# Patient Record
Sex: MALE | Race: BLACK OR AFRICAN AMERICAN | NOT HISPANIC OR LATINO
[De-identification: names, ages, dates, MRNs, and addresses within clinical notes are randomized per-mention and may not be internally consistent; named-entity substitution may affect disease eponyms.]

---

## 2017-03-07 PROBLEM — Z00.00 ENCOUNTER FOR PREVENTIVE HEALTH EXAMINATION: Status: ACTIVE | Noted: 2017-03-07

## 2017-03-24 ENCOUNTER — APPOINTMENT (OUTPATIENT)
Dept: VASCULAR SURGERY | Facility: CLINIC | Age: 52
End: 2017-03-24

## 2019-11-25 ENCOUNTER — INPATIENT (INPATIENT)
Facility: HOSPITAL | Age: 54
LOS: 5 days | Discharge: HOME | End: 2019-12-01
Attending: HOSPITALIST | Admitting: HOSPITALIST
Payer: MEDICARE

## 2019-11-25 VITALS
TEMPERATURE: 102 F | OXYGEN SATURATION: 96 % | HEART RATE: 103 BPM | RESPIRATION RATE: 18 BRPM | DIASTOLIC BLOOD PRESSURE: 89 MMHG | SYSTOLIC BLOOD PRESSURE: 135 MMHG

## 2019-11-25 DIAGNOSIS — Z94.0 KIDNEY TRANSPLANT STATUS: Chronic | ICD-10-CM

## 2019-11-25 DIAGNOSIS — N18.3 CHRONIC KIDNEY DISEASE, STAGE 3 (MODERATE): ICD-10-CM

## 2019-11-25 LAB
ALBUMIN SERPL ELPH-MCNC: 3.8 G/DL — SIGNIFICANT CHANGE UP (ref 3.5–5.2)
ALP SERPL-CCNC: 81 U/L — SIGNIFICANT CHANGE UP (ref 30–115)
ALT FLD-CCNC: 16 U/L — SIGNIFICANT CHANGE UP (ref 0–41)
ANION GAP SERPL CALC-SCNC: 16 MMOL/L — HIGH (ref 7–14)
APPEARANCE UR: CLEAR — SIGNIFICANT CHANGE UP
AST SERPL-CCNC: 20 U/L — SIGNIFICANT CHANGE UP (ref 0–41)
BACTERIA # UR AUTO: NEGATIVE — SIGNIFICANT CHANGE UP
BASOPHILS # BLD AUTO: 0.02 K/UL — SIGNIFICANT CHANGE UP (ref 0–0.2)
BASOPHILS NFR BLD AUTO: 0.3 % — SIGNIFICANT CHANGE UP (ref 0–1)
BILIRUB SERPL-MCNC: 2.1 MG/DL — HIGH (ref 0.2–1.2)
BILIRUB UR-MCNC: NEGATIVE — SIGNIFICANT CHANGE UP
BUN SERPL-MCNC: 21 MG/DL — HIGH (ref 10–20)
CALCIUM SERPL-MCNC: 9.6 MG/DL — SIGNIFICANT CHANGE UP (ref 8.5–10.1)
CHLORIDE SERPL-SCNC: 98 MMOL/L — SIGNIFICANT CHANGE UP (ref 98–110)
CO2 SERPL-SCNC: 22 MMOL/L — SIGNIFICANT CHANGE UP (ref 17–32)
COLOR SPEC: YELLOW — SIGNIFICANT CHANGE UP
CREAT SERPL-MCNC: 1.6 MG/DL — HIGH (ref 0.7–1.5)
DIFF PNL FLD: ABNORMAL
EOSINOPHIL # BLD AUTO: 0 K/UL — SIGNIFICANT CHANGE UP (ref 0–0.7)
EOSINOPHIL NFR BLD AUTO: 0 % — SIGNIFICANT CHANGE UP (ref 0–8)
EPI CELLS # UR: 1 /HPF — SIGNIFICANT CHANGE UP (ref 0–5)
GLUCOSE SERPL-MCNC: 101 MG/DL — HIGH (ref 70–99)
GLUCOSE UR QL: NEGATIVE — SIGNIFICANT CHANGE UP
HCT VFR BLD CALC: 39 % — LOW (ref 42–52)
HGB BLD-MCNC: 12.7 G/DL — LOW (ref 14–18)
HYALINE CASTS # UR AUTO: 0 /LPF — SIGNIFICANT CHANGE UP (ref 0–7)
IMM GRANULOCYTES NFR BLD AUTO: 0.8 % — HIGH (ref 0.1–0.3)
KETONES UR-MCNC: NEGATIVE — SIGNIFICANT CHANGE UP
LACTATE SERPL-SCNC: 1.3 MMOL/L — SIGNIFICANT CHANGE UP (ref 0.5–2.2)
LEUKOCYTE ESTERASE UR-ACNC: NEGATIVE — SIGNIFICANT CHANGE UP
LIDOCAIN IGE QN: 23 U/L — SIGNIFICANT CHANGE UP (ref 7–60)
LYMPHOCYTES # BLD AUTO: 0.4 K/UL — LOW (ref 1.2–3.4)
LYMPHOCYTES # BLD AUTO: 6.4 % — LOW (ref 20.5–51.1)
MAGNESIUM SERPL-MCNC: 1.4 MG/DL — LOW (ref 1.8–2.4)
MCHC RBC-ENTMCNC: 29.6 PG — SIGNIFICANT CHANGE UP (ref 27–31)
MCHC RBC-ENTMCNC: 32.6 G/DL — SIGNIFICANT CHANGE UP (ref 32–37)
MCV RBC AUTO: 90.9 FL — SIGNIFICANT CHANGE UP (ref 80–94)
MONOCYTES # BLD AUTO: 0.53 K/UL — SIGNIFICANT CHANGE UP (ref 0.1–0.6)
MONOCYTES NFR BLD AUTO: 8.5 % — SIGNIFICANT CHANGE UP (ref 1.7–9.3)
NEUTROPHILS # BLD AUTO: 5.21 K/UL — SIGNIFICANT CHANGE UP (ref 1.4–6.5)
NEUTROPHILS NFR BLD AUTO: 84 % — HIGH (ref 42.2–75.2)
NITRITE UR-MCNC: NEGATIVE — SIGNIFICANT CHANGE UP
NRBC # BLD: 0 /100 WBCS — SIGNIFICANT CHANGE UP (ref 0–0)
PH UR: 6.5 — SIGNIFICANT CHANGE UP (ref 5–8)
PLATELET # BLD AUTO: 73 K/UL — LOW (ref 130–400)
POTASSIUM SERPL-MCNC: 4.5 MMOL/L — SIGNIFICANT CHANGE UP (ref 3.5–5)
POTASSIUM SERPL-SCNC: 4.5 MMOL/L — SIGNIFICANT CHANGE UP (ref 3.5–5)
PROT SERPL-MCNC: 7 G/DL — SIGNIFICANT CHANGE UP (ref 6–8)
PROT UR-MCNC: ABNORMAL
RBC # BLD: 4.29 M/UL — LOW (ref 4.7–6.1)
RBC # FLD: 12.2 % — SIGNIFICANT CHANGE UP (ref 11.5–14.5)
RBC CASTS # UR COMP ASSIST: 20 /HPF — HIGH (ref 0–4)
SODIUM SERPL-SCNC: 136 MMOL/L — SIGNIFICANT CHANGE UP (ref 135–146)
SP GR SPEC: 1.02 — SIGNIFICANT CHANGE UP (ref 1.01–1.02)
UROBILINOGEN FLD QL: ABNORMAL
WBC # BLD: 6.21 K/UL — SIGNIFICANT CHANGE UP (ref 4.8–10.8)
WBC # FLD AUTO: 6.21 K/UL — SIGNIFICANT CHANGE UP (ref 4.8–10.8)
WBC UR QL: 11 /HPF — HIGH (ref 0–5)

## 2019-11-25 PROCEDURE — 71046 X-RAY EXAM CHEST 2 VIEWS: CPT | Mod: 26

## 2019-11-25 PROCEDURE — 76770 US EXAM ABDO BACK WALL COMP: CPT | Mod: 26

## 2019-11-25 PROCEDURE — 99285 EMERGENCY DEPT VISIT HI MDM: CPT

## 2019-11-25 RX ORDER — ALLOPURINOL 300 MG
1 TABLET ORAL
Qty: 0 | Refills: 0 | DISCHARGE

## 2019-11-25 RX ORDER — METOPROLOL TARTRATE 50 MG
100 TABLET ORAL
Refills: 0 | Status: DISCONTINUED | OUTPATIENT
Start: 2019-11-25 | End: 2019-12-01

## 2019-11-25 RX ORDER — OMEPRAZOLE 10 MG/1
1 CAPSULE, DELAYED RELEASE ORAL
Qty: 0 | Refills: 0 | DISCHARGE

## 2019-11-25 RX ORDER — CYCLOSPORINE 100 MG/1
75 CAPSULE ORAL AT BEDTIME
Refills: 0 | Status: DISCONTINUED | OUTPATIENT
Start: 2019-11-25 | End: 2019-12-01

## 2019-11-25 RX ORDER — CYCLOSPORINE 100 MG/1
100 CAPSULE ORAL DAILY
Refills: 0 | Status: DISCONTINUED | OUTPATIENT
Start: 2019-11-25 | End: 2019-12-01

## 2019-11-25 RX ORDER — MYCOPHENOLATE MOFETIL 250 MG/1
500 CAPSULE ORAL
Refills: 0 | Status: DISCONTINUED | OUTPATIENT
Start: 2019-11-25 | End: 2019-11-25

## 2019-11-25 RX ORDER — CHLORHEXIDINE GLUCONATE 213 G/1000ML
1 SOLUTION TOPICAL
Refills: 0 | Status: DISCONTINUED | OUTPATIENT
Start: 2019-11-25 | End: 2019-12-01

## 2019-11-25 RX ORDER — CEFTRIAXONE 500 MG/1
1000 INJECTION, POWDER, FOR SOLUTION INTRAMUSCULAR; INTRAVENOUS ONCE
Refills: 0 | Status: COMPLETED | OUTPATIENT
Start: 2019-11-25 | End: 2019-11-25

## 2019-11-25 RX ORDER — AMLODIPINE BESYLATE 2.5 MG/1
5 TABLET ORAL DAILY
Refills: 0 | Status: DISCONTINUED | OUTPATIENT
Start: 2019-11-25 | End: 2019-12-01

## 2019-11-25 RX ORDER — AMLODIPINE BESYLATE 2.5 MG/1
1 TABLET ORAL
Qty: 0 | Refills: 0 | DISCHARGE

## 2019-11-25 RX ORDER — MYCOPHENOLATE MOFETIL 250 MG/1
1 CAPSULE ORAL
Qty: 0 | Refills: 0 | DISCHARGE

## 2019-11-25 RX ORDER — APIXABAN 2.5 MG/1
5 TABLET, FILM COATED ORAL EVERY 12 HOURS
Refills: 0 | Status: DISCONTINUED | OUTPATIENT
Start: 2019-11-25 | End: 2019-12-01

## 2019-11-25 RX ORDER — SODIUM CHLORIDE 9 MG/ML
2500 INJECTION, SOLUTION INTRAVENOUS ONCE
Refills: 0 | Status: COMPLETED | OUTPATIENT
Start: 2019-11-25 | End: 2019-11-25

## 2019-11-25 RX ORDER — ACETAMINOPHEN 500 MG
650 TABLET ORAL ONCE
Refills: 0 | Status: COMPLETED | OUTPATIENT
Start: 2019-11-25 | End: 2019-11-25

## 2019-11-25 RX ORDER — CYCLOSPORINE 100 MG/1
1 CAPSULE ORAL
Qty: 0 | Refills: 0 | DISCHARGE

## 2019-11-25 RX ORDER — CYCLOSPORINE 100 MG/1
100 CAPSULE ORAL DAILY
Refills: 0 | Status: DISCONTINUED | OUTPATIENT
Start: 2019-11-25 | End: 2019-11-25

## 2019-11-25 RX ORDER — METOPROLOL TARTRATE 50 MG
1 TABLET ORAL
Qty: 0 | Refills: 0 | DISCHARGE

## 2019-11-25 RX ORDER — ALLOPURINOL 300 MG
100 TABLET ORAL DAILY
Refills: 0 | Status: DISCONTINUED | OUTPATIENT
Start: 2019-11-25 | End: 2019-12-01

## 2019-11-25 RX ORDER — MEROPENEM 1 G/30ML
1000 INJECTION INTRAVENOUS EVERY 12 HOURS
Refills: 0 | Status: DISCONTINUED | OUTPATIENT
Start: 2019-11-25 | End: 2019-11-29

## 2019-11-25 RX ORDER — PRAZOSIN HCL 2 MG
1 CAPSULE ORAL
Qty: 0 | Refills: 0 | DISCHARGE

## 2019-11-25 RX ORDER — DOXAZOSIN MESYLATE 4 MG
2 TABLET ORAL AT BEDTIME
Refills: 0 | Status: DISCONTINUED | OUTPATIENT
Start: 2019-11-25 | End: 2019-11-27

## 2019-11-25 RX ORDER — APIXABAN 2.5 MG/1
1 TABLET, FILM COATED ORAL
Qty: 0 | Refills: 0 | DISCHARGE

## 2019-11-25 RX ORDER — PANTOPRAZOLE SODIUM 20 MG/1
40 TABLET, DELAYED RELEASE ORAL
Refills: 0 | Status: DISCONTINUED | OUTPATIENT
Start: 2019-11-25 | End: 2019-12-01

## 2019-11-25 RX ORDER — MAGNESIUM OXIDE 400 MG ORAL TABLET 241.3 MG
400 TABLET ORAL
Refills: 0 | Status: COMPLETED | OUTPATIENT
Start: 2019-11-25 | End: 2019-11-26

## 2019-11-25 RX ADMIN — CYCLOSPORINE 75 MILLIGRAM(S): 100 CAPSULE ORAL at 21:19

## 2019-11-25 RX ADMIN — Medication 100 MILLIGRAM(S): at 17:49

## 2019-11-25 RX ADMIN — MEROPENEM 100 MILLIGRAM(S): 1 INJECTION INTRAVENOUS at 17:50

## 2019-11-25 RX ADMIN — AMLODIPINE BESYLATE 5 MILLIGRAM(S): 2.5 TABLET ORAL at 17:49

## 2019-11-25 RX ADMIN — Medication 2 MILLIGRAM(S): at 21:19

## 2019-11-25 RX ADMIN — Medication 100 MILLIGRAM(S): at 21:24

## 2019-11-25 RX ADMIN — MAGNESIUM OXIDE 400 MG ORAL TABLET 400 MILLIGRAM(S): 241.3 TABLET ORAL at 17:47

## 2019-11-25 RX ADMIN — Medication 650 MILLIGRAM(S): at 13:42

## 2019-11-25 RX ADMIN — SODIUM CHLORIDE 2500 MILLILITER(S): 9 INJECTION, SOLUTION INTRAVENOUS at 13:40

## 2019-11-25 RX ADMIN — CEFTRIAXONE 100 MILLIGRAM(S): 500 INJECTION, POWDER, FOR SOLUTION INTRAMUSCULAR; INTRAVENOUS at 14:58

## 2019-11-25 NOTE — H&P ADULT - ASSESSMENT
54 year old male with history of kidney transplant ( 14 years ago ), HTN, DVT on Eliquis, and BPH presents with fever for 3 days duration.     # Sepsis secondary to urinary tract infection, failed treatment with cefdinir, Positive culture ( No details ) from [facility]   # Painful Macroscopic Hematuria. Stable Hemoglobin   # Hyperbilirubinemia   # Stage 3A CKD, Cr 1.6 at Baseline per old chart    # H/O BPH, Uncontrolled Obstructing Urinary Symptoms on Prazosin  # H/O Kidney transplant, ON Cyclosporin and Mycophenolate  # HTN, Controlled. On Amlodipine; developed LE edema, continued per PMD  # DVT on Eliquis   # HypoMG    - Obtain records from [facility]  - Meropenem, check the culture and repeat here   - Febrile, and hemodynamically stable    - Renal Ultrasound   - Urology Eval   - Fractionated Bilirubin   - C/w with cyclosporin and mycophenolate  - replet MG    - DVT PPX on AC  - DASH diet   - Full code   - Dispo Home 54 year old male with history of kidney transplant ( 14 years ago ), HTN, DVT on Eliquis, and BPH presents with fever for 3 days duration.     # Sepsis secondary to urinary tract infection, failed treatment with cefdinir, Positive culture ( No details ) from [facility]   # Painful Macroscopic Hematuria. Stable Hemoglobin   # Hyperbilirubinemia   # Stage 3A CKD, Cr 1.6 at Baseline per old chart    # H/O BPH, Uncontrolled Obstructing Urinary Symptoms on Prazosin  # H/O Kidney transplant, ON Cyclosporin, prednisone and Mycophenolate  # HTN, Controlled. On Amlodipine; developed LE edema, continued per PMD  # DVT on Eliquis   # HypoMG    - Obtain records from [facility]  - Meropenem, check the culture and repeat here   - Febrile, and hemodynamically stable    - Renal Ultrasound   - Urology Eval   - Fractionated Bilirubin   - C/w with cyclosporin and mycophenolate  - replet MG    - DVT PPX on AC  - DASH diet   - Full code   - Dispo Home 54 year old male with history of kidney transplant ( 14 years ago ), HTN, DVT on Eliquis, and BPH presents with fever for 3 days duration.     # Sepsis secondary to urinary tract infection, failed treatment with cefdinir, Positive culture ( No details ) from [facility].   # Painful Macroscopic Hematuria. Stable Hemoglobin, ( cystitis vs stone? vs prostate enlargement vs side effect of cyclosporine )   # Hyperbilirubinemia, cyclosporin is associated with hyperBili  # Stage 3A CKD, Cr 1.6 at Baseline per old chart    # H/O BPH, Uncontrolled Obstructing Urinary Symptoms on Prazosin  # H/O Kidney transplant, ON Cyclosporin, prednisone and Mycophenolate  # HTN, Controlled. On Amlodipine; developed LE edema, continued per PMD  # DVT on Eliquis   # HypoMG    - Obtain records from [facility]  - Meropenem, check the culture and repeat here   - Febrile, and hemodynamically stable    - CT Abdomin pelvis to rule out obstructive uropathy   - Urology Eval   - Fractionated Bilirubin   - C/w with cyclosporin and mycophenolate  - replet MG  - f/u CBC    - DVT PPX on AC  - DASH diet   - Full code   - Dispo Home 54 year old male with history of kidney transplant ( 14 years ago ), HTN, DVT on Eliquis, and BPH presents with fever for 3 days duration.     # Sepsis secondary to urinary tract infection, failed treatment with cefdinir, Positive culture ( No details ) from [facility].   # Painful Macroscopic Hematuria. Stable Hemoglobin, ( cystitis vs stone? vs prostate enlargement vs side effect of cyclosporine )   # Hyperbilirubinemia, cyclosporin is associated with hyperBili  # Stage 3A CKD, Cr 1.6 at Baseline per old chart    # H/O BPH, Uncontrolled Obstructing Urinary Symptoms on Prazosin  # H/O Kidney transplant, ON Cyclosporin, prednisone and Mycophenolate  # HTN, Controlled. On Amlodipine; developed LE edema, continued per PMD  # DVT on Eliquis   # HypoMG    - Obtain records from [facility]  - Meropenem, check the culture and repeat here   - Febrile, and hemodynamically stable    - Renal US, rule out obstructive uropathy  - Urology Eval   - Fractionated Bilirubin   - C/w with cyclosporin and mycophenolate  - replet MG  - f/u CBC    - DVT PPX on AC  - DASH diet   - Full code   - Dispo Home 54 year old male with history of kidney transplant ( 14 years ago ), HTN, DVT on Eliquis, and BPH presents with fever for 3 days duration.     # Sepsis secondary to urinary tract infection, failed treatment with cefdinir, Positive culture ( No details ) from [facility].   # Painful Macroscopic Hematuria. Stable Hemoglobin, ( cystitis vs stone? vs prostate enlargement vs side effect of cyclosporine )   # Hyperbilirubinemia, cyclosporin is associated with hyperBili  # Stage 3A CKD, Cr 1.6 at Baseline per old chart    # H/O BPH, Uncontrolled Obstructing Urinary Symptoms on Prazosin  # H/O Kidney transplant, ON Cyclosporin, prednisone and Mycophenolate  # HTN, Controlled. On Amlodipine; developed LE edema, continued per PMD  # DVT on Eliquis   # HypoMG    - Obtain records from [facility]  - Meropenem, check the culture and repeat here   - Febrile, and hemodynamically stable    - Renal US, rule out obstructive uropathy  - Urology Eval   - Fractionated Bilirubin   - C/w with cyclosporin, mycophenolate, prednisone and allopurinol  - replete MG  - f/u CBC    - DVT PPX on AC  - DASH diet   - Full code   - Dispo Home

## 2019-11-25 NOTE — ED ADULT TRIAGE NOTE - CHIEF COMPLAINT QUOTE
was at [facility] and had blood cultures drawn. was called this morning and told he had bacteria growth. [place] so he came here. pt had uti. and placed on abx. running fever since saturday.

## 2019-11-25 NOTE — ED PROVIDER NOTE - ATTENDING CONTRIBUTION TO CARE
53 yo M htn, renal transplant, now sent for + blood cx at [facility] done 3 days ago.  still with fevers.  vss, nontoxic, well appearing, pink conj, anicteric, MMM, neck supple, CTAB, RRR, equal radial pulses bilat, abd soft/nt/nd, no cva tend. no calves tend, no edema, no fnd. no rashes.  a/p: labs, imaging, reassess    IVF given as per ideal body weight.  BMI > 30

## 2019-11-25 NOTE — ED PROVIDER NOTE - PHYSICAL EXAMINATION
VITAL SIGNS: I have reviewed nursing notes and confirm.  CONSTITUTIONAL: Well-developed; well-nourished; in no acute distress. pt comfortable.  SKIN: skin exam is warm and dry, no acute rash.   HEAD: Normocephalic; atraumatic.  EYES:  EOM intact; conjunctiva and sclera clear.  ENT: No nasal discharge; airway clear. moist oral mucosa;  NECK: Supple; non tender.  CARD: S1, S2 normal; no murmurs, gallops, or rubs. Regular rate and rhythm. posterior tibial and radial pulses 2+  RESP: No wheezes, rales or rhonchi. cta b/l. no use of accessory muscles. no retractions  ABD: Normal bowel sounds; soft; non-distended; non-tender; no rebound. negative psoas, rovsign's and murphys.  EXT: Normal ROM. No  cyanosis or edema.  BACK: No cva tenderness  LYMPH: No acute cervical adenopathy.  NEURO: Alert, oriented, grossly unremarkable.    PSYCH: Cooperative, appropriate.

## 2019-11-25 NOTE — H&P ADULT - NSHPPHYSICALEXAM_GEN_ALL_CORE
GENERAL: NAD, lying in bed comfortably  HEAD:  Atraumatic, Normocephalic  EYES: EOMI, PERRLA, conjunctiva and sclera clear  ENT: Moist mucous membranes  NECK: Supple, No JVD  CHEST/LUNG: Clear to auscultation bilaterally  HEART: Regular rate and rhythm; No murmurs, rubs, or gallops  ABDOMEN: Soft, Nontender, Nondistended. No hepatomegally  EXTREMITIES:  2+ Peripheral Pulses, brisk capillary refill. No clubbing, cyanosis, , b/l lower EX edema  NERVOUS SYSTEM:  Alert & Oriented X3, speech clear. No deficits   MSK: FROM all 4 extremities, full and equal strength  SKIN: No rashes or lesions Vital Signs Last 24 Hrs  T(C): 37.3 (26 Nov 2019 07:35), Max: 37.9 (26 Nov 2019 00:50)  T(F): 99.2 (26 Nov 2019 07:35), Max: 100.3 (26 Nov 2019 00:50)  HR: 77 (26 Nov 2019 07:35) (65 - 77)  BP: 124/74 (26 Nov 2019 07:35) (110/66 - 133/91)  BP(mean): --  RR: 18 (26 Nov 2019 07:35) (18 - 19)  SpO2: 98% (26 Nov 2019 07:35) (98% - 99%)    GENERAL: NAD, lying in bed comfortably  HEAD:  Atraumatic, Normocephalic  EYES: EOMI, PERRLA, conjunctiva and sclera clear  ENT: Moist mucous membranes  NECK: Supple, No JVD  CHEST/LUNG: Clear to auscultation bilaterally  HEART: Regular rate and rhythm; No murmurs, rubs, or gallops  ABDOMEN: Soft, Nontender, Nondistended. No hepatomegally  EXTREMITIES:  2+ Peripheral Pulses, brisk capillary refill. No clubbing, cyanosis, , b/l lower EX edema  NERVOUS SYSTEM:  Alert & Oriented X3, speech clear. No deficits   MSK: FROM all 4 extremities, full and equal strength  SKIN: No rashes or lesions

## 2019-11-25 NOTE — H&P ADULT - HISTORY OF PRESENT ILLNESS
54 year old male with history of kidney transplant ( 14 years ago ), HTN, DVT on Eliquis, and  BPH presents with fever for 3 days duration.   Patient was doing well until 3 days prior to presentation when started to have fever and chills associated with red urine and burning sensation with urination. Was seen in [facility] ED  and started on Cefdinir, fever has not resolved and patient got call back from the ED as his culture - not sure source - came back positive. Was not told which type of bacteria.   Symptoms has not resolved since started the Abx, Reports obstructing urinary symptoms for the last month including; hesitancy, straining, slow flow  frequent urination, continuos feeling of full bladder and dysuria.  In ED Temp 102.4, 103, 135/89, and RR18. admitted for Sepsis secondary to Pyelonephritis.

## 2019-11-25 NOTE — ED PROVIDER NOTE - OBJECTIVE STATEMENT
54 year old male with history of kidney transplant ( 14 years ago ), HTN, DVT on Eliquis, and  BPH presents with fever for 3 days duration.   Patient was doing well until 3 days prior to presentation when started to have fever and chills associated with red urine and burning sensation with urination. Was seen in [facility] ED  and started on Cefdinir, fever has not resolved and patient got call back from the ED as his culture - not sure source - came back positive. Was not told which type of bacteria.   Symptoms has not resolved since started the Abx, Reports obstructing urinary symptoms for the last month including; hesitancy, straining, slow flow  frequent urination, continuos feeling of full bladder and dysuria.

## 2019-11-26 LAB
APPEARANCE UR: CLEAR — SIGNIFICANT CHANGE UP
BILIRUB UR-MCNC: NEGATIVE — SIGNIFICANT CHANGE UP
COLOR SPEC: YELLOW — SIGNIFICANT CHANGE UP
DIFF PNL FLD: NEGATIVE — SIGNIFICANT CHANGE UP
GLUCOSE UR QL: NEGATIVE — SIGNIFICANT CHANGE UP
KETONES UR-MCNC: NEGATIVE — SIGNIFICANT CHANGE UP
LEUKOCYTE ESTERASE UR-ACNC: NEGATIVE — SIGNIFICANT CHANGE UP
NITRITE UR-MCNC: NEGATIVE — SIGNIFICANT CHANGE UP
PH UR: 6.5 — SIGNIFICANT CHANGE UP (ref 5–8)
PROT UR-MCNC: SIGNIFICANT CHANGE UP
SP GR SPEC: 1.01 — SIGNIFICANT CHANGE UP (ref 1.01–1.02)
UROBILINOGEN FLD QL: ABNORMAL

## 2019-11-26 PROCEDURE — 99223 1ST HOSP IP/OBS HIGH 75: CPT | Mod: AI

## 2019-11-26 RX ORDER — MAGNESIUM SULFATE 500 MG/ML
1 VIAL (ML) INJECTION ONCE
Refills: 0 | Status: COMPLETED | OUTPATIENT
Start: 2019-11-26 | End: 2019-11-26

## 2019-11-26 RX ORDER — ACETAMINOPHEN 500 MG
650 TABLET ORAL ONCE
Refills: 0 | Status: COMPLETED | OUTPATIENT
Start: 2019-11-26 | End: 2019-11-26

## 2019-11-26 RX ORDER — INFLUENZA VIRUS VACCINE 15; 15; 15; 15 UG/.5ML; UG/.5ML; UG/.5ML; UG/.5ML
0.5 SUSPENSION INTRAMUSCULAR ONCE
Refills: 0 | Status: DISCONTINUED | OUTPATIENT
Start: 2019-11-26 | End: 2019-12-01

## 2019-11-26 RX ADMIN — Medication 100 MILLIGRAM(S): at 17:25

## 2019-11-26 RX ADMIN — APIXABAN 5 MILLIGRAM(S): 2.5 TABLET, FILM COATED ORAL at 17:25

## 2019-11-26 RX ADMIN — Medication 5 MILLIGRAM(S): at 05:31

## 2019-11-26 RX ADMIN — MEROPENEM 100 MILLIGRAM(S): 1 INJECTION INTRAVENOUS at 05:30

## 2019-11-26 RX ADMIN — Medication 2 MILLIGRAM(S): at 21:31

## 2019-11-26 RX ADMIN — Medication 100 GRAM(S): at 12:09

## 2019-11-26 RX ADMIN — CYCLOSPORINE 75 MILLIGRAM(S): 100 CAPSULE ORAL at 21:31

## 2019-11-26 RX ADMIN — Medication 100 MILLIGRAM(S): at 12:09

## 2019-11-26 RX ADMIN — MEROPENEM 100 MILLIGRAM(S): 1 INJECTION INTRAVENOUS at 17:25

## 2019-11-26 RX ADMIN — Medication 650 MILLIGRAM(S): at 00:50

## 2019-11-26 RX ADMIN — Medication 650 MILLIGRAM(S): at 01:47

## 2019-11-26 RX ADMIN — MAGNESIUM OXIDE 400 MG ORAL TABLET 400 MILLIGRAM(S): 241.3 TABLET ORAL at 09:16

## 2019-11-26 RX ADMIN — APIXABAN 5 MILLIGRAM(S): 2.5 TABLET, FILM COATED ORAL at 05:31

## 2019-11-26 RX ADMIN — Medication 100 MILLIGRAM(S): at 05:33

## 2019-11-26 RX ADMIN — AMLODIPINE BESYLATE 5 MILLIGRAM(S): 2.5 TABLET ORAL at 05:31

## 2019-11-26 NOTE — CONSULT NOTE ADULT - SUBJECTIVE AND OBJECTIVE BOX
[place] [facility] INITIAL CONSULT NOTE  --------------------------------------------------------------------------------  HPI:  54 year old male with history of end stage renal disease presumed secondary to hypertensive nephrosclerosis. He is status post  donor kidney transplant in  at [place]. He also has a PMH of HTN, DVT on Eliquis, and  BPH.  He presents with fever for 3 days duration.   Patient was doing well until 3 days prior to presentation when started to have fever and chills associated with hematuria and burning sensation with urination. Was seen in [facility] ED  and started on Cefdinir, fever has not resolved and patient got call back from the ED as his blood culture came back positive and was told to come back to the ER.  Symptoms has not resolved since started the Abx, Reports obstructing urinary symptoms for the last month including; hesitancy, straining, slow flow  frequent urination, continuos feeling of full bladder and dysuria.  In ED Temp 102.4, 103, 135/89, and RR18. Admitted for Sepsis secondary to Pyelonephritis.     PAST HISTORY  --------------------------------------------------------------------------------  PAST MEDICAL & SURGICAL HISTORY:  HTN (hypertension)  Kidney transplanted  Transplanted kidney    FAMILY HISTORY:  FH: kidney disease    SOCIAL HISTORY:  Denies current ETOH, tobacco, or illicit drug use    ALLERGIES & MEDICATIONS  --------------------------------------------------------------------------------  Allergies    No Known Allergies    Standing Inpatient Medications  allopurinol 100 milliGRAM(s) Oral daily  amLODIPine   Tablet 5 milliGRAM(s) Oral daily  apixaban 5 milliGRAM(s) Oral every 12 hours  chlorhexidine 4% Liquid 1 Application(s) Topical <User Schedule>  cycloSPORINE  (SandIMMUNE) 100 milliGRAM(s) Oral daily  cycloSPORINE  (SandIMMUNE) 75 milliGRAM(s) Oral at bedtime  doxazosin 2 milliGRAM(s) Oral at bedtime  influenza   Vaccine 0.5 milliLiter(s) IntraMuscular once  magnesium oxide 400 milliGRAM(s) Oral three times a day with meals  magnesium sulfate  IVPB 1 Gram(s) IV Intermittent once  meropenem  IVPB 1000 milliGRAM(s) IV Intermittent every 12 hours  metoprolol tartrate 100 milliGRAM(s) Oral two times a day  pantoprazole    Tablet 40 milliGRAM(s) Oral before breakfast  predniSONE   Tablet 5 milliGRAM(s) Oral daily    REVIEW OF SYSTEMS  --------------------------------------------------------------------------------  Gen:  fevers/chills  Skin: No rashes  Head/Eyes/Ears/Mouth: No headache; No sinus pain/discomfort, sore throat  Respiratory: No dyspnea, cough, wheezing, hemoptysis  CV: No chest pain, PND, orthopnea  GI: No abdominal pain, diarrhea, constipation, nausea, vomiting, melena, hematochezia  All other systems were reviewed and are negative, except as noted.    VITALS/PHYSICAL EXAM  --------------------------------------------------------------------------------  T(C): 37.3 (19 @ 07:35), Max: 39.1 (19 @ 11:55)  HR: 77 (11-26-19 @ 07:35) (65 - 103)  BP: 124/74 (19 @ 07:35) (110/66 - 135/89)  RR: 18 (19 @ 07:35) (18 - 19)  SpO2: 98% (19 @ 07:35) (96% - 99%)  Weight (kg): 96.6 (19 @ 11:59)    Physical Exam:  	Gen: NAD  	HEENT: Supple neck, clear oropharynx  	Pulm: CTA B/L  	CV: RRR, S1S2  	Back: No CVA tenderness; no sacral edema  	Abd: +BS, soft, nontender/nondistended  	: No suprapubic tenderness  	LE: Warm, no edema  	Neuro: AAO x3  	Psych: Normal affect and mood    LABS/STUDIES  --------------------------------------------------------------------------------              12.7   6.21  >-----------<  73       [19 @ 14:40]              39.0     136  |  98  |  21  ----------------------------<  101      [19 @ 14:40]  4.5   |  22  |  1.6        Ca     9.6     [19 @ 14:40]      Mg     1.4     [19 @ 14:40]    TPro  7.0  /  Alb  3.8  /  TBili  2.1  /  DBili  x   /  AST  20  /  ALT  16  /  AlkPhos  81  [19 @ 14:40]    Creatinine Trend:  SCr 1.6 [ @ 14:40]    Urinalysis - [19 @ 08:54]      Color Yellow / Appearance Clear / SG 1.013 / pH 6.5      Gluc Negative / Ketone Negative  / Bili Negative / Urobili 3 mg/dL       Blood Negative / Protein Trace / Leuk Est Negative / Nitrite Negative      RBC  / WBC  / Hyaline  / Gran  / Sq Epi  / Non Sq Epi  / Bacteria

## 2019-11-26 NOTE — CONSULT NOTE ADULT - SUBJECTIVE AND OBJECTIVE BOX
This is a 53 y/o male s/p renal transplant in May 2005. Urology was consulted for hematuria. Pt states he noticed blood in his urine since Saturday morning. Patient complains of feeling pressure after and before he urinates. Patient admits to medium stream, frequency q 45min - 1 hour, urgency incontinence, nocturia x 6. Patient is a 54y old  Male who presents with a chief complaint of fever and urinary symptoms (2019 11:48)      HPI:  54 year old male with history of kidney transplant ( 14 years ago ), HTN, DVT on Eliquis, and  BPH presents with fever for 3 days duration.   Patient was doing well until 3 days prior to presentation when started to have fever and chills associated with red urine and burning sensation with urination. Was seen in [facility] ED  and started on Cefdinir, fever has not resolved and patient got call back from the ED as his culture - not sure source - came back positive. Was not told which type of bacteria.   Symptoms has not resolved since started the Abx, Reports obstructing urinary symptoms for the last month including; hesitancy, straining, slow flow  frequent urination, continuos feeling of full bladder and dysuria.  In ED Temp 102.4, 103, 135/89, and RR18. Admitted for Sepsis secondary to Pyelonephritis. (2019 16:29)    UROLOGY: Pt with c/o dysuria and hematuria upon presentation.  Medium stream, + frequency q 45 min - 1 hr, nocturia x 5-6, + moderate urgency with incontinence, denies hesitancy, straining,   voids in small amounts, + PV fullness and dribbling    PAST MEDICAL & SURGICAL HISTORY:  HTN (hypertension)  Kidney transplanted  Transplanted kidney      REVIEW OF SYSTEMS:  [x] a 10 point review of systems was negative except where noted    [  ]  Due to altered mental status/intubation, subjective information was not able t be obtained from the patient.  History was obtained, to the extent possible, from review of the chart and collateral sources of information.      MEDICATIONS  (STANDING):  allopurinol 100 milliGRAM(s) Oral daily  amLODIPine   Tablet 5 milliGRAM(s) Oral daily  apixaban 5 milliGRAM(s) Oral every 12 hours  chlorhexidine 4% Liquid 1 Application(s) Topical <User Schedule>  cycloSPORINE  (SandIMMUNE) 100 milliGRAM(s) Oral daily  cycloSPORINE  (SandIMMUNE) 75 milliGRAM(s) Oral at bedtime  doxazosin 2 milliGRAM(s) Oral at bedtime  influenza   Vaccine 0.5 milliLiter(s) IntraMuscular once  meropenem  IVPB 1000 milliGRAM(s) IV Intermittent every 12 hours  metoprolol tartrate 100 milliGRAM(s) Oral two times a day  pantoprazole    Tablet 40 milliGRAM(s) Oral before breakfast  predniSONE   Tablet 5 milliGRAM(s) Oral daily    MEDICATIONS  (PRN):      Allergies    No Known Allergies    SOCIAL HISTORY: No illicit drug use    FAMILY HISTORY:  FH: kidney disease    Vital Signs Last 24 Hrs  T(C): 37.3 (2019 07:35), Max: 37.9 (2019 00:50)  T(F): 99.2 (2019 07:35), Max: 100.3 (2019 00:50)  HR: 77 (2019 07:35) (65 - 77)  BP: 124/74 (2019 07:35) (110/66 - 133/91)  RR: 18 (2019 07:35) (18 - 19)  SpO2: 98% (2019 07:35) (98% - 99%)      PHYSICAL EXAM:    Constitutional: NAD, well-developed  Back: No CVA tenderness  Respiratory: No accessory respiratory muscle use  Abd: Soft, NT/ND  no organomegally  no hernia  : uncircumcised easily retractible no lesions, testicles x 2 sym, non tender   VANDANA: deferred at this time  Extremities: no edema  Neurological: A/O x 3  Psychiatric: Normal mood, normal affect  Skin: No rashes    I&O's Summary      LABS:                        12.7   6.21  )-----------( 73       ( 2019 14:40 )             39.0         136  |  98  |  21<H>  ----------------------------<  101<H>  4.5   |  22  |  1.6<H>    Ca    9.6      2019 14:40  Mg     1.4         TPro  7.0  /  Alb  3.8  /  TBili  2.1<H>  /  DBili  x   /  AST  20  /  ALT  16  /  AlkPhos  81        Urinalysis Basic - ( 2019 08:54 )    Color: Yellow / Appearance: Clear / S.013 / pH: x  Gluc: x / Ketone: Negative  / Bili: Negative / Urobili: 3 mg/dL   Blood: x / Protein: Trace / Nitrite: Negative   Leuk Esterase: Negative / RBC: x / WBC x   Sq Epi: x / Non Sq Epi: x / Bacteria: x      Urine Culture: pending        RADIOLOGY & ADDITIONAL STUDIES:      US Retroperitoneal Complete (19 @ 18:36) >    EXAM:  US RETROPERITONEAL COMPLETE            PROCEDURE DATE:  2019            INTERPRETATION:  CLINICAL HISTORY: Possible obstruction. Back pain.    COMPARISON: None.    PROCEDURE: Retroperitoneal Ultrasound was performed.    FINDINGS:    RIGHT KIDNEY: Atrophic and echogenic, size measuring 7.6 cm in length.   Right upper pole cyst measures 3.5 x 4.5 x 4.1 cm. No evidence of   hydronephrosis, calculus or solid mass. Vascular flow is demonstrated at   the hilum.    LEFT KIDNEY: Atrophic and echogenic, size measuring 8.1 cm in length. No   evidence of hydronephrosis, calculus or solid mass. Vascular flow is   demonstrated at the hilum.     RIGHT ILIAC FOSSA TRANSPLANT KIDNEY: Normal in size measuring 11.8 cm in   length. Normal cortical medullary differentiation. No hydronephrosis. No   definite calculi. Normal perfusion.    URINARY BLADDER: Prevoid volume of approximately 356 cc.  No wall   thickening, debris or calculus is seen. Bilateral ureteral jets are   visualized. Postvoid volume is approximately 159 cc.    IMPRESSION:  No stones or hydronephrosis.    Atrophic native kidneys.    Right iliac fossa transplant kidney is normal in appearance, without   stones or hydronephrosis.    Post void residual volume of 159 cc.        NATALIIA LACEY M.D., ATTENDING RADIOLOGIST  This document has been electronically signed. 2019  7:01PM  < end of copied text >

## 2019-11-26 NOTE — PROGRESS NOTE ADULT - ASSESSMENT
# Sepsis secondary to urinary tract infection, failed treatment with cefdinir, Positive culture ( No details ) from [facility].   - f/u urine cultures from this admission  - c/w meropenem for now  - US shows no hydronephrosis  - last fever 100.3 overnight      # Painful Macroscopic Hematuria. Stable Hemoglobin, ( cystitis vs stone? vs prostate enlargement vs side effect of cyclosporine )   # Hyperbilirubinemia, cyclosporin is associated with hyperBili  # Stage 3A CKD, Cr 1.6 at Baseline per old chart    # H/O BPH, Uncontrolled Obstructing Urinary Symptoms on Prazosin  # H/O Kidney transplant, ON Cyclosporin, prednisone and Mycophenolate  # HTN, Controlled. On Amlodipine; developed LE edema, continued per PMD  # DVT on Eliquis   # HypoMG    - Obtain records from [facility]  - Meropenem, check the culture and repeat here   - Febrile, and hemodynamically stable    - Renal US, rule out obstructive uropathy  - Urology Eval   - Fractionated Bilirubin   - C/w with cyclosporin, mycophenolate, prednisone and allopurinol  - replete MG  - f/u CBC    - DVT PPX on AC  - DASH diet   - Full code   - Dispo Home

## 2019-11-26 NOTE — PROGRESS NOTE ADULT - SUBJECTIVE AND OBJECTIVE BOX
SUBJECTIVE:    Patient is a 54y old Male who presents with a chief complaint of fever and urinary symptoms (2019 11:41)    Stable, no acute events.    PAST MEDICAL & SURGICAL HISTORY  HTN (hypertension)  Kidney transplanted  Transplanted kidney       ALLERGIES:  No Known Allergies    MEDICATIONS:  STANDING MEDICATIONS  allopurinol 100 milliGRAM(s) Oral daily  amLODIPine   Tablet 5 milliGRAM(s) Oral daily  apixaban 5 milliGRAM(s) Oral every 12 hours  chlorhexidine 4% Liquid 1 Application(s) Topical <User Schedule>  cycloSPORINE  (SandIMMUNE) 100 milliGRAM(s) Oral daily  cycloSPORINE  (SandIMMUNE) 75 milliGRAM(s) Oral at bedtime  doxazosin 2 milliGRAM(s) Oral at bedtime  influenza   Vaccine 0.5 milliLiter(s) IntraMuscular once  magnesium oxide 400 milliGRAM(s) Oral three times a day with meals  magnesium sulfate  IVPB 1 Gram(s) IV Intermittent once  meropenem  IVPB 1000 milliGRAM(s) IV Intermittent every 12 hours  metoprolol tartrate 100 milliGRAM(s) Oral two times a day  pantoprazole    Tablet 40 milliGRAM(s) Oral before breakfast  predniSONE   Tablet 5 milliGRAM(s) Oral daily    PRN MEDICATIONS    VITALS:   T(F): 99.2  HR: 77  BP: 124/74  RR: 18  SpO2: 98%    LABS:                        12.7   6.21  )-----------( 73       ( 2019 14:40 )             39.0         136  |  98  |  21<H>  ----------------------------<  101<H>  4.5   |  22  |  1.6<H>    Ca    9.6      2019 14:40  Mg     1.4         TPro  7.0  /  Alb  3.8  /  TBili  2.1<H>  /  DBili  x   /  AST  20  /  ALT  16  /  AlkPhos  81        Urinalysis Basic - ( 2019 08:54 )    Color: Yellow / Appearance: Clear / S.013 / pH: x  Gluc: x / Ketone: Negative  / Bili: Negative / Urobili: 3 mg/dL   Blood: x / Protein: Trace / Nitrite: Negative   Leuk Esterase: Negative / RBC: x / WBC x   Sq Epi: x / Non Sq Epi: x / Bacteria: x        Lactate, Blood: 1.3 mmol/L (19 @ 14:40)

## 2019-11-27 DIAGNOSIS — R31.0 GROSS HEMATURIA: ICD-10-CM

## 2019-11-27 LAB
ANION GAP SERPL CALC-SCNC: 14 MMOL/L — SIGNIFICANT CHANGE UP (ref 7–14)
ANISOCYTOSIS BLD QL: SLIGHT — SIGNIFICANT CHANGE UP
BASOPHILS # BLD AUTO: 0.04 K/UL — SIGNIFICANT CHANGE UP (ref 0–0.2)
BASOPHILS NFR BLD AUTO: 0.9 % — SIGNIFICANT CHANGE UP (ref 0–1)
BUN SERPL-MCNC: 28 MG/DL — HIGH (ref 10–20)
CALCIUM SERPL-MCNC: 8.8 MG/DL — SIGNIFICANT CHANGE UP (ref 8.5–10.1)
CHLORIDE SERPL-SCNC: 100 MMOL/L — SIGNIFICANT CHANGE UP (ref 98–110)
CO2 SERPL-SCNC: 23 MMOL/L — SIGNIFICANT CHANGE UP (ref 17–32)
CREAT SERPL-MCNC: 1.4 MG/DL — SIGNIFICANT CHANGE UP (ref 0.7–1.5)
CULTURE RESULTS: NO GROWTH — SIGNIFICANT CHANGE UP
EOSINOPHIL # BLD AUTO: 0 K/UL — SIGNIFICANT CHANGE UP (ref 0–0.7)
EOSINOPHIL NFR BLD AUTO: 0 % — SIGNIFICANT CHANGE UP (ref 0–8)
GIANT PLATELETS BLD QL SMEAR: PRESENT — SIGNIFICANT CHANGE UP
GLUCOSE SERPL-MCNC: 136 MG/DL — HIGH (ref 70–99)
HCT VFR BLD CALC: 35.1 % — LOW (ref 42–52)
HGB BLD-MCNC: 11.3 G/DL — LOW (ref 14–18)
LYMPHOCYTES # BLD AUTO: 0.51 K/UL — LOW (ref 1.2–3.4)
LYMPHOCYTES # BLD AUTO: 13 % — LOW (ref 20.5–51.1)
MAGNESIUM SERPL-MCNC: 1.5 MG/DL — LOW (ref 1.8–2.4)
MANUAL SMEAR VERIFICATION: SIGNIFICANT CHANGE UP
MCHC RBC-ENTMCNC: 29 PG — SIGNIFICANT CHANGE UP (ref 27–31)
MCHC RBC-ENTMCNC: 32.2 G/DL — SIGNIFICANT CHANGE UP (ref 32–37)
MCV RBC AUTO: 90.2 FL — SIGNIFICANT CHANGE UP (ref 80–94)
MICROCYTES BLD QL: SLIGHT — SIGNIFICANT CHANGE UP
MONOCYTES # BLD AUTO: 0.51 K/UL — SIGNIFICANT CHANGE UP (ref 0.1–0.6)
MONOCYTES NFR BLD AUTO: 13 % — HIGH (ref 1.7–9.3)
NEUTROPHILS # BLD AUTO: 2.65 K/UL — SIGNIFICANT CHANGE UP (ref 1.4–6.5)
NEUTROPHILS NFR BLD AUTO: 63.5 % — SIGNIFICANT CHANGE UP (ref 42.2–75.2)
NEUTS BAND # BLD: 3.5 % — SIGNIFICANT CHANGE UP (ref 0–6)
PLAT MORPH BLD: NORMAL — SIGNIFICANT CHANGE UP
PLATELET # BLD AUTO: 80 K/UL — LOW (ref 130–400)
POTASSIUM SERPL-MCNC: 4.1 MMOL/L — SIGNIFICANT CHANGE UP (ref 3.5–5)
POTASSIUM SERPL-SCNC: 4.1 MMOL/L — SIGNIFICANT CHANGE UP (ref 3.5–5)
PROMYELOCYTES # FLD: 0.9 % — HIGH (ref 0–0)
RBC # BLD: 3.89 M/UL — LOW (ref 4.7–6.1)
RBC # FLD: 12.2 % — SIGNIFICANT CHANGE UP (ref 11.5–14.5)
RBC BLD AUTO: NORMAL — SIGNIFICANT CHANGE UP
SODIUM SERPL-SCNC: 137 MMOL/L — SIGNIFICANT CHANGE UP (ref 135–146)
SPECIMEN SOURCE: SIGNIFICANT CHANGE UP
VARIANT LYMPHS # BLD: 5.2 % — HIGH (ref 0–5)
WBC # BLD: 3.95 K/UL — LOW (ref 4.8–10.8)
WBC # FLD AUTO: 3.95 K/UL — LOW (ref 4.8–10.8)

## 2019-11-27 PROCEDURE — 99233 SBSQ HOSP IP/OBS HIGH 50: CPT

## 2019-11-27 RX ORDER — MAGNESIUM SULFATE 500 MG/ML
2 VIAL (ML) INJECTION ONCE
Refills: 0 | Status: COMPLETED | OUTPATIENT
Start: 2019-11-27 | End: 2019-11-28

## 2019-11-27 RX ORDER — TAMSULOSIN HYDROCHLORIDE 0.4 MG/1
0.4 CAPSULE ORAL AT BEDTIME
Refills: 0 | Status: DISCONTINUED | OUTPATIENT
Start: 2019-11-27 | End: 2019-12-01

## 2019-11-27 RX ORDER — ACETAMINOPHEN 500 MG
650 TABLET ORAL ONCE
Refills: 0 | Status: COMPLETED | OUTPATIENT
Start: 2019-11-27 | End: 2019-11-27

## 2019-11-27 RX ADMIN — MEROPENEM 100 MILLIGRAM(S): 1 INJECTION INTRAVENOUS at 17:03

## 2019-11-27 RX ADMIN — CYCLOSPORINE 100 MILLIGRAM(S): 100 CAPSULE ORAL at 10:32

## 2019-11-27 RX ADMIN — Medication 5 MILLIGRAM(S): at 06:06

## 2019-11-27 RX ADMIN — MEROPENEM 100 MILLIGRAM(S): 1 INJECTION INTRAVENOUS at 06:05

## 2019-11-27 RX ADMIN — Medication 650 MILLIGRAM(S): at 06:33

## 2019-11-27 RX ADMIN — Medication 100 MILLIGRAM(S): at 11:41

## 2019-11-27 RX ADMIN — CYCLOSPORINE 75 MILLIGRAM(S): 100 CAPSULE ORAL at 22:02

## 2019-11-27 RX ADMIN — PANTOPRAZOLE SODIUM 40 MILLIGRAM(S): 20 TABLET, DELAYED RELEASE ORAL at 06:06

## 2019-11-27 RX ADMIN — Medication 100 MILLIGRAM(S): at 06:06

## 2019-11-27 RX ADMIN — AMLODIPINE BESYLATE 5 MILLIGRAM(S): 2.5 TABLET ORAL at 06:06

## 2019-11-27 RX ADMIN — APIXABAN 5 MILLIGRAM(S): 2.5 TABLET, FILM COATED ORAL at 17:12

## 2019-11-27 RX ADMIN — Medication 100 MILLIGRAM(S): at 17:04

## 2019-11-27 RX ADMIN — TAMSULOSIN HYDROCHLORIDE 0.4 MILLIGRAM(S): 0.4 CAPSULE ORAL at 21:57

## 2019-11-27 RX ADMIN — APIXABAN 5 MILLIGRAM(S): 2.5 TABLET, FILM COATED ORAL at 06:06

## 2019-11-27 NOTE — PROGRESS NOTE ADULT - SUBJECTIVE AND OBJECTIVE BOX
SUBJECTIVE:    Patient is a 54y old Male who presents with a chief complaint of fever and urinary symptoms (2019 14:10)    Stable, no acute events.    PAST MEDICAL & SURGICAL HISTORY  HTN (hypertension)  Kidney transplanted  Transplanted kidney       ALLERGIES:  No Known Allergies    MEDICATIONS:  STANDING MEDICATIONS  allopurinol 100 milliGRAM(s) Oral daily  amLODIPine   Tablet 5 milliGRAM(s) Oral daily  apixaban 5 milliGRAM(s) Oral every 12 hours  chlorhexidine 4% Liquid 1 Application(s) Topical <User Schedule>  cycloSPORINE  (SandIMMUNE) 100 milliGRAM(s) Oral daily  cycloSPORINE  (SandIMMUNE) 75 milliGRAM(s) Oral at bedtime  influenza   Vaccine 0.5 milliLiter(s) IntraMuscular once  magnesium sulfate  IVPB 2 Gram(s) IV Intermittent once  meropenem  IVPB 1000 milliGRAM(s) IV Intermittent every 12 hours  metoprolol tartrate 100 milliGRAM(s) Oral two times a day  pantoprazole    Tablet 40 milliGRAM(s) Oral before breakfast  predniSONE   Tablet 5 milliGRAM(s) Oral daily  tamsulosin 0.4 milliGRAM(s) Oral at bedtime    PRN MEDICATIONS    VITALS:   T(F): 96.7  HR: 62  BP: 127/86  RR: 18  SpO2: 96%    LABS:                        11.3   3.95  )-----------( 80       ( 2019 07:47 )             35.1     11-    137  |  100  |  28<H>  ----------------------------<  136<H>  4.1   |  23  |  1.4    Ca    8.8      2019 07:47  Mg     1.5     11-        Urinalysis Basic - ( 2019 08:54 )    Color: Yellow / Appearance: Clear / S.013 / pH: x  Gluc: x / Ketone: Negative  / Bili: Negative / Urobili: 3 mg/dL   Blood: x / Protein: Trace / Nitrite: Negative   Leuk Esterase: Negative / RBC: x / WBC x   Sq Epi: x / Non Sq Epi: x / Bacteria: x            Culture - Urine (collected 2019 08:54)  Source: .Urine Clean Catch (Midstream)  Final Report (2019 16:17):    No growth    Culture - Urine (collected 2019 18:00)  Source: .Urine Clean Catch (Midstream)  Final Report (2019 01:50):    No growth    Culture - Urine (collected 2019 12:41)  Source: .Urine Clean Catch (Midstream)  Final Report (2019 00:14):    No growth    Culture - Blood (collected 2019 12:41)  Source: .Blood Blood  Preliminary Report (2019 23:01):    No growth to date.                  PHYSICAL EXAM:  GEN: NAD, comfortable  LUNGS: CTAB, no w/r/r  HEART: RRR, no m/r/g  ABD: soft, NT/ND, +BS  EXT: no edema, PP b/l  NEURO: AAOx3

## 2019-11-27 NOTE — PROGRESS NOTE ADULT - ASSESSMENT
# Sepsis secondary to urinary tract infection, failed treatment with cefdinir, Positive culture ( No details ) from [facility].   - f/u urine cultures and blood cultures from this admission  - c/w meropenem for now  - US shows no hydronephrosis  - afebrile for over 24hrs  - reached out to [facility] - had blood cultures positive for E. Coli - mostly pansensitive   - thus far cultures on this admission have been negative, will repeat in AM    # Painful Macroscopic Hematuria. Stable Hemoglobin, ( cystitis vs stone? vs prostate enlargement vs side effect of cyclosporine )   # Hyperbilirubinemia, cyclosporin is associated with hyperBili  # Stage 3A CKD, Cr 1.6 at Baseline per old chart    # H/O BPH, Uncontrolled Obstructing Urinary Symptoms on Prazosin - switched over to tamsulosin   # H/O Kidney transplant, ON Cyclosporin, prednisone and Mycophenolate  # HTN, Controlled. On Amlodipine; developed LE edema, continued per PMD  # DVT on Eliquis   # HypoMG  - repleted    - DVT PPX on AC  - DASH diet   - Full code   - Dispo Home # Sepsis secondary to urinary tract infection, failed treatment with cefdinir, Positive culture ( No details ) from [facility].   - f/u urine cultures and blood cultures from this admission  - c/w meropenem for now  - US shows no hydronephrosis  - afebrile for over 24hrs  - reached out to [facility] ED - had blood cultures positive for E. Coli - mostly pansensitive - sensitive to ceftriaxone, bactrim, cefdinir, resistant to unasyn, cefazolin  - thus far cultures on this admission have been negative, will repeat in AM    # Painful Macroscopic Hematuria. Stable Hemoglobin, ( cystitis vs stone? vs prostate enlargement vs side effect of cyclosporine )   # Hyperbilirubinemia, cyclosporin is associated with hyperBili  # Stage 3A CKD, Cr 1.6 at Baseline per old chart    # H/O BPH, Uncontrolled Obstructing Urinary Symptoms on Prazosin - switched over to tamsulosin   # H/O Kidney transplant, ON Cyclosporin, prednisone and Mycophenolate  # HTN, Controlled. On Amlodipine; developed LE edema, continued per PMD  # DVT on Eliquis   # HypoMG  - repleted    - DVT PPX on AC  - DASH diet   - Full code   - Dispo Home

## 2019-11-27 NOTE — PROGRESS NOTE ADULT - ASSESSMENT
Pt is a 55yo M s/p renal transplant presented with      A) Moderate lower  urinary tract symptoms.  incomplete bladder emptying   hematuria        P) start Flomax 0.4mg if not contraindicated   consider repeat sonogram in 24 hour to check PVR.  OP f/u for LUTS w/u cystoscopy for hematuria. Pt is a 53yo M s/p renal transplant presented with      A) Moderate lower  urinary tract symptoms.  incomplete bladder emptying   hematuria        P) start Flomax 0.4mg if not contraindicated   would prefer this over cardura/prazosin for BPH, as more efficacious d/w medical team  consider repeat sonogram in 24 hour to check PVR.  OP f/u for LUTS w/u cystoscopy for hematuria.

## 2019-11-27 NOTE — PROGRESS NOTE ADULT - SUBJECTIVE AND OBJECTIVE BOX
[facility] FOLLOW UP NOTE  --------------------------------------------------------------------------------  24 hour events/subjective: Patient examined. Appears comfortable.    PAST HISTORY  --------------------------------------------------------------------------------  No significant changes to PMH, PSH, FHx, SHx, unless otherwise noted    ALLERGIES & MEDICATIONS  --------------------------------------------------------------------------------  Allergies    No Known Allergies    Standing Inpatient Medications  allopurinol 100 milliGRAM(s) Oral daily  amLODIPine   Tablet 5 milliGRAM(s) Oral daily  apixaban 5 milliGRAM(s) Oral every 12 hours  chlorhexidine 4% Liquid 1 Application(s) Topical <User Schedule>  cycloSPORINE  (SandIMMUNE) 100 milliGRAM(s) Oral daily  cycloSPORINE  (SandIMMUNE) 75 milliGRAM(s) Oral at bedtime  influenza   Vaccine 0.5 milliLiter(s) IntraMuscular once  meropenem  IVPB 1000 milliGRAM(s) IV Intermittent every 12 hours  metoprolol tartrate 100 milliGRAM(s) Oral two times a day  pantoprazole    Tablet 40 milliGRAM(s) Oral before breakfast  predniSONE   Tablet 5 milliGRAM(s) Oral daily  tamsulosin 0.4 milliGRAM(s) Oral at bedtime    VITALS/PHYSICAL EXAM  --------------------------------------------------------------------------------  T(C): 36.2 (11-27-19 @ 07:52), Max: 37.4 (11-27-19 @ 06:02)  HR: 63 (11-27-19 @ 07:52) (63 - 76)  BP: 139/86 (11-27-19 @ 07:52) (109/74 - 139/86)  RR: 18 (11-27-19 @ 07:52) (18 - 18)  SpO2: 96% (11-27-19 @ 07:52) (96% - 97%)    Physical Exam:  	Gen: NAD  	Pulm: CTA B/L  	CV: RRR, S1S2  	Abd: +BS, soft, nontender/nondistended  	: No suprapubic tenderness  	LE: Warm, no edema    LABS/STUDIES  --------------------------------------------------------------------------------              11.3   3.95  >-----------<  80       [11-27-19 @ 07:47]              35.1     137  |  100  |  28  ----------------------------<  136      [11-27-19 @ 07:47]  4.1   |  23  |  1.4        Ca     8.8     [11-27-19 @ 07:47]      Mg     1.5     [11-27-19 @ 07:47]    TPro  7.0  /  Alb  3.8  /  TBili  2.1  /  DBili  x   /  AST  20  /  ALT  16  /  AlkPhos  81  [11-25-19 @ 14:40]    Creatinine Trend:  SCr 1.4 [11-27 @ 07:47]  SCr 1.6 [11-25 @ 14:40]    Urinalysis - [11-26-19 @ 08:54]      Color Yellow / Appearance Clear / SG 1.013 / pH 6.5      Gluc Negative / Ketone Negative  / Bili Negative / Urobili 3 mg/dL       Blood Negative / Protein Trace / Leuk Est Negative / Nitrite Negative      RBC  / WBC  / Hyaline  / Gran  / Sq Epi  / Non Sq Epi  / Bacteria

## 2019-11-27 NOTE — PROGRESS NOTE ADULT - ASSESSMENT
1. UTI  2. ?Bacteremia/septicemia  3. Kidney transplant, creatinine baseline  4. 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 HTN    Plan:    Obtain medical records from [facility]  Continue meropenem  Followup blood and urine cultures (clear so far)  ID f/u  F/U cyclosporine level  Hold mycophenolate for now, resume once afebrile x 24 hours  Magnesium supplementation  Daily BMP

## 2019-11-27 NOTE — PROGRESS NOTE ADULT - SUBJECTIVE AND OBJECTIVE BOX
HPI:    PAST MEDICAL & SURGICAL HISTORY:  HTN (hypertension)  Kidney transplanted  Transplanted kidney      Allergies    No Known Allergies        MEDICATIONS  (STANDING):  allopurinol 100 milliGRAM(s) Oral daily  amLODIPine   Tablet 5 milliGRAM(s) Oral daily  apixaban 5 milliGRAM(s) Oral every 12 hours  chlorhexidine 4% Liquid 1 Application(s) Topical <User Schedule>  cycloSPORINE  (SandIMMUNE) 100 milliGRAM(s) Oral daily  cycloSPORINE  (SandIMMUNE) 75 milliGRAM(s) Oral at bedtime  doxazosin 2 milliGRAM(s) Oral at bedtime  influenza   Vaccine 0.5 milliLiter(s) IntraMuscular once  meropenem  IVPB 1000 milliGRAM(s) IV Intermittent every 12 hours  metoprolol tartrate 100 milliGRAM(s) Oral two times a day  pantoprazole    Tablet 40 milliGRAM(s) Oral before breakfast  predniSONE   Tablet 5 milliGRAM(s) Oral daily      ROS  General:	no fever, weight loss,  chills  Respiratory and Thorax: no cough, wheeze,  sob  Cardiovascular:	no chest pain, palpitations, dizziness  Gastrointestinal:	no nausea, vomiting, diarrhea, abd pain  Genitourinary:	no dysuria, hematuria          Vital Signs Last 24 Hrs  T(F): 97.2 (2019 07:52), Max: 99.3 (2019 06:02)  HR: 63 (2019 07:52) (63 - 76)  BP: 139/86 (2019 07:52) (109/74 - 139/86)  RR: 18 (2019 07:52) (18 - 18)  SpO2: 96% (2019 07:52) (96% - 97%)    PHYSICAL EXAM:      Constitutional: A&Ox4  Respiratory: cta b/l  Cardiovascular: s1 s2 rrr  Gastrointestinal: soft nt  nd + bs no rebound or guarding  Genitourinary: no cva tenderness  Extremities: normal rom, no edema, calf tenderness                              11.3   3.95  )-----------( 80       ( 2019 07:47 )             35.1           137  |  100  |  28<H>  ----------------------------<  136<H>  4.1   |  23  |  1.4    Ca    8.8      2019 07:47  Mg     1.5         TPro  7.0  /  Alb  3.8  /  TBili  2.1<H>  /  DBili  x   /  AST  20  /  ALT  16  /  AlkPhos  81            Urinalysis Basic - ( 2019 08:54 )    Color: Yellow / Appearance: Clear / S.013 / pH: x  Gluc: x / Ketone: Negative  / Bili: Negative / Urobili: 3 mg/dL   Blood: x / Protein: Trace / Nitrite: Negative   Leuk Esterase: Negative / RBC: x / WBC x   Sq Epi: x / Non Sq Epi: x / Bacteria: x      < from: US Retroperitoneal Complete (19 @ 18:36) >    IMPRESSION:  No stones or hydronephrosis.    Atrophic native kidneys.    Right iliac fossa transplant kidney is normal in appearance, without   stones or hydronephrosis.    Post void residual volume of 159 cc.    < end of copied text > HPI:  pt reports improvemen in urinary sx, stream is better today, dysuria resolved.    PAST MEDICAL & SURGICAL HISTORY:  HTN (hypertension)  Kidney transplanted  Transplanted kidney      Allergies    No Known Allergies        MEDICATIONS  (STANDING):  allopurinol 100 milliGRAM(s) Oral daily  amLODIPine   Tablet 5 milliGRAM(s) Oral daily  apixaban 5 milliGRAM(s) Oral every 12 hours  chlorhexidine 4% Liquid 1 Application(s) Topical <User Schedule>  cycloSPORINE  (SandIMMUNE) 100 milliGRAM(s) Oral daily  cycloSPORINE  (SandIMMUNE) 75 milliGRAM(s) Oral at bedtime  doxazosin 2 milliGRAM(s) Oral at bedtime  influenza   Vaccine 0.5 milliLiter(s) IntraMuscular once  meropenem  IVPB 1000 milliGRAM(s) IV Intermittent every 12 hours  metoprolol tartrate 100 milliGRAM(s) Oral two times a day  pantoprazole    Tablet 40 milliGRAM(s) Oral before breakfast  predniSONE   Tablet 5 milliGRAM(s) Oral daily      ROS  General:	no fever, weight loss,  chills  Respiratory and Thorax: no cough, wheeze,  sob  Cardiovascular:	no chest pain, palpitations, dizziness  Gastrointestinal:	no nausea, vomiting, diarrhea, abd pain  Genitourinary:	no dysuria, hematuria          Vital Signs Last 24 Hrs  T(F): 97.2 (2019 07:52), Max: 99.3 (2019 06:02)  HR: 63 (2019 07:52) (63 - 76)  BP: 139/86 (2019 07:52) (109/74 - 139/86)  RR: 18 (2019 07:52) (18 - 18)  SpO2: 96% (2019 07:52) (96% - 97%)    PHYSICAL EXAM:      Constitutional: A&Ox4  Respiratory: cta b/l  Cardiovascular: s1 s2 rrr  Gastrointestinal: soft nt  nd + bs no rebound or guarding  Genitourinary: no cva tenderness  Extremities: normal rom, no edema, calf tenderness                              11.3   3.95  )-----------( 80       ( 2019 07:47 )             35.1           137  |  100  |  28<H>  ----------------------------<  136<H>  4.1   |  23  |  1.4    Ca    8.8      2019 07:47  Mg     1.5         TPro  7.0  /  Alb  3.8  /  TBili  2.1<H>  /  DBili  x   /  AST  20  /  ALT  16  /  AlkPhos  81            Urinalysis Basic - ( 2019 08:54 )    Color: Yellow / Appearance: Clear / S.013 / pH: x  Gluc: x / Ketone: Negative  / Bili: Negative / Urobili: 3 mg/dL   Blood: x / Protein: Trace / Nitrite: Negative   Leuk Esterase: Negative / RBC: x / WBC x   Sq Epi: x / Non Sq Epi: x / Bacteria: x      < from: US Retroperitoneal Complete (19 @ 18:36) >    IMPRESSION:  No stones or hydronephrosis.    Atrophic native kidneys.    Right iliac fossa transplant kidney is normal in appearance, without   stones or hydronephrosis.    Post void residual volume of 159 cc.    < end of copied text >

## 2019-11-27 NOTE — PROGRESS NOTE ADULT - SUBJECTIVE AND OBJECTIVE BOX
pt seen and examined.   pt was told to come to hospital because he was called by his doc ( [facility] ) due to positive blood culture. we have been trying to get the record from them but so far unable to get it.     Has no symptoms.   Vital Signs Last 24 Hrs  T(C): 36.2 (27 Nov 2019 07:52), Max: 37.4 (27 Nov 2019 06:02)  T(F): 97.2 (27 Nov 2019 07:52), Max: 99.3 (27 Nov 2019 06:02)  HR: 63 (27 Nov 2019 07:52) (63 - 76)  BP: 139/86 (27 Nov 2019 07:52) (109/74 - 139/86)  RR: 18 (27 Nov 2019 07:52) (18 - 18)  SpO2: 96% (27 Nov 2019 07:52) (96% - 97%)    Physical exam:   constitutional NAD, AAOX3, Respiratory  lungs CTA, CVS heart RRR, GI: abdomen Soft NT, ND, BS+, skin: intact  neuro exam non focal.                           11.3   3.95  )-----------( 80       ( 27 Nov 2019 07:47 )             35.1   11-27    137  |  100  |  28<H>  ----------------------------<  136<H>  4.1   |  23  |  1.4    Ca    8.8      27 Nov 2019 07:47  Mg     1.5     11-27    TPro  7.0  /  Alb  3.8  /  TBili  2.1<H>  /  DBili  x   /  AST  20  /  ALT  16  /  AlkPhos  81  11-25    a/p    # Bacteremia, pt asymptomatic, repeat bc pending.   # Status kidney transplant, cont meds, check tacrolimus level   # hypomag, mag def, replete, repeat labs.   #HTN, cont meds    #Progress Note Handoff  Pending (specify):  BC  Family discussion: dw pt fullcode.   Disposition: Home_

## 2019-11-28 DIAGNOSIS — N40.1 BENIGN PROSTATIC HYPERPLASIA WITH LOWER URINARY TRACT SYMPTOMS: ICD-10-CM

## 2019-11-28 LAB
ANION GAP SERPL CALC-SCNC: 17 MMOL/L — HIGH (ref 7–14)
BASOPHILS # BLD AUTO: 0.02 K/UL — SIGNIFICANT CHANGE UP (ref 0–0.2)
BASOPHILS NFR BLD AUTO: 0.5 % — SIGNIFICANT CHANGE UP (ref 0–1)
BUN SERPL-MCNC: 25 MG/DL — HIGH (ref 10–20)
CALCIUM SERPL-MCNC: 9.1 MG/DL — SIGNIFICANT CHANGE UP (ref 8.5–10.1)
CHLORIDE SERPL-SCNC: 100 MMOL/L — SIGNIFICANT CHANGE UP (ref 98–110)
CO2 SERPL-SCNC: 22 MMOL/L — SIGNIFICANT CHANGE UP (ref 17–32)
CREAT SERPL-MCNC: 1.3 MG/DL — SIGNIFICANT CHANGE UP (ref 0.7–1.5)
EOSINOPHIL # BLD AUTO: 0.07 K/UL — SIGNIFICANT CHANGE UP (ref 0–0.7)
EOSINOPHIL NFR BLD AUTO: 1.6 % — SIGNIFICANT CHANGE UP (ref 0–8)
GLUCOSE SERPL-MCNC: 100 MG/DL — HIGH (ref 70–99)
HCT VFR BLD CALC: 38.2 % — LOW (ref 42–52)
HGB BLD-MCNC: 12.3 G/DL — LOW (ref 14–18)
IMM GRANULOCYTES NFR BLD AUTO: 2.3 % — HIGH (ref 0.1–0.3)
LYMPHOCYTES # BLD AUTO: 1.37 K/UL — SIGNIFICANT CHANGE UP (ref 1.2–3.4)
LYMPHOCYTES # BLD AUTO: 31.9 % — SIGNIFICANT CHANGE UP (ref 20.5–51.1)
MAGNESIUM SERPL-MCNC: 2 MG/DL — SIGNIFICANT CHANGE UP (ref 1.8–2.4)
MCHC RBC-ENTMCNC: 28.7 PG — SIGNIFICANT CHANGE UP (ref 27–31)
MCHC RBC-ENTMCNC: 32.2 G/DL — SIGNIFICANT CHANGE UP (ref 32–37)
MCV RBC AUTO: 89.3 FL — SIGNIFICANT CHANGE UP (ref 80–94)
MONOCYTES # BLD AUTO: 0.56 K/UL — SIGNIFICANT CHANGE UP (ref 0.1–0.6)
MONOCYTES NFR BLD AUTO: 13 % — HIGH (ref 1.7–9.3)
NEUTROPHILS # BLD AUTO: 2.18 K/UL — SIGNIFICANT CHANGE UP (ref 1.4–6.5)
NEUTROPHILS NFR BLD AUTO: 50.7 % — SIGNIFICANT CHANGE UP (ref 42.2–75.2)
NRBC # BLD: 0 /100 WBCS — SIGNIFICANT CHANGE UP (ref 0–0)
PLATELET # BLD AUTO: 104 K/UL — LOW (ref 130–400)
POTASSIUM SERPL-MCNC: 4.1 MMOL/L — SIGNIFICANT CHANGE UP (ref 3.5–5)
POTASSIUM SERPL-SCNC: 4.1 MMOL/L — SIGNIFICANT CHANGE UP (ref 3.5–5)
RBC # BLD: 4.28 M/UL — LOW (ref 4.7–6.1)
RBC # FLD: 12.1 % — SIGNIFICANT CHANGE UP (ref 11.5–14.5)
SODIUM SERPL-SCNC: 139 MMOL/L — SIGNIFICANT CHANGE UP (ref 135–146)
WBC # BLD: 4.3 K/UL — LOW (ref 4.8–10.8)
WBC # FLD AUTO: 4.3 K/UL — LOW (ref 4.8–10.8)

## 2019-11-28 PROCEDURE — 99233 SBSQ HOSP IP/OBS HIGH 50: CPT

## 2019-11-28 RX ORDER — MYCOPHENOLATE MOFETIL 250 MG/1
500 CAPSULE ORAL
Refills: 0 | Status: DISCONTINUED | OUTPATIENT
Start: 2019-11-28 | End: 2019-12-01

## 2019-11-28 RX ADMIN — PANTOPRAZOLE SODIUM 40 MILLIGRAM(S): 20 TABLET, DELAYED RELEASE ORAL at 05:49

## 2019-11-28 RX ADMIN — APIXABAN 5 MILLIGRAM(S): 2.5 TABLET, FILM COATED ORAL at 05:49

## 2019-11-28 RX ADMIN — TAMSULOSIN HYDROCHLORIDE 0.4 MILLIGRAM(S): 0.4 CAPSULE ORAL at 21:01

## 2019-11-28 RX ADMIN — MEROPENEM 100 MILLIGRAM(S): 1 INJECTION INTRAVENOUS at 17:29

## 2019-11-28 RX ADMIN — CYCLOSPORINE 100 MILLIGRAM(S): 100 CAPSULE ORAL at 09:03

## 2019-11-28 RX ADMIN — Medication 5 MILLIGRAM(S): at 05:49

## 2019-11-28 RX ADMIN — Medication 100 MILLIGRAM(S): at 05:49

## 2019-11-28 RX ADMIN — Medication 50 GRAM(S): at 00:22

## 2019-11-28 RX ADMIN — APIXABAN 5 MILLIGRAM(S): 2.5 TABLET, FILM COATED ORAL at 17:29

## 2019-11-28 RX ADMIN — CYCLOSPORINE 75 MILLIGRAM(S): 100 CAPSULE ORAL at 21:01

## 2019-11-28 RX ADMIN — MYCOPHENOLATE MOFETIL 500 MILLIGRAM(S): 250 CAPSULE ORAL at 21:01

## 2019-11-28 RX ADMIN — AMLODIPINE BESYLATE 5 MILLIGRAM(S): 2.5 TABLET ORAL at 05:49

## 2019-11-28 RX ADMIN — MEROPENEM 100 MILLIGRAM(S): 1 INJECTION INTRAVENOUS at 05:49

## 2019-11-28 RX ADMIN — Medication 100 MILLIGRAM(S): at 17:29

## 2019-11-28 RX ADMIN — Medication 100 MILLIGRAM(S): at 13:08

## 2019-11-28 RX ADMIN — CHLORHEXIDINE GLUCONATE 1 APPLICATION(S): 213 SOLUTION TOPICAL at 05:48

## 2019-11-28 NOTE — PROGRESS NOTE ADULT - PROBLEM SELECTOR PROBLEM 1
Benign prostatic hyperplasia with lower urinary tract symptoms, symptom details unspecified
Gross hematuria

## 2019-11-28 NOTE — PROGRESS NOTE ADULT - SUBJECTIVE AND OBJECTIVE BOX
Patient reports having urinary frequency, hesitancy and intermittency for more than a year.  Feels that symptoms are better on tamsulosin  Abdomen:  soft  :  No CVA tenderness

## 2019-11-28 NOTE — PROGRESS NOTE ADULT - SUBJECTIVE AND OBJECTIVE BOX
Hospital Day:  3d    Subjective:    Patient is a 54y old  Male who presents with a chief complaint of fever and urinary symptoms (2019 17:11)      Past Medical Hx:   HTN (hypertension)  Kidney transplanted    Past Sx:  Transplanted kidney    Allergies:  No Known Allergies    Current Meds:   [facility] Meds:  allopurinol 100 milliGRAM(s) Oral daily  amLODIPine   Tablet 5 milliGRAM(s) Oral daily  apixaban 5 milliGRAM(s) Oral every 12 hours  chlorhexidine 4% Liquid 1 Application(s) Topical <User Schedule>  cycloSPORINE  (SandIMMUNE) 100 milliGRAM(s) Oral daily  cycloSPORINE  (SandIMMUNE) 75 milliGRAM(s) Oral at bedtime  influenza   Vaccine 0.5 milliLiter(s) IntraMuscular once  meropenem  IVPB 1000 milliGRAM(s) IV Intermittent every 12 hours  metoprolol tartrate 100 milliGRAM(s) Oral two times a day  mycophenolate mofetil 500 milliGRAM(s) Oral two times a day  pantoprazole    Tablet 40 milliGRAM(s) Oral before breakfast  predniSONE   Tablet 5 milliGRAM(s) Oral daily  tamsulosin 0.4 milliGRAM(s) Oral at bedtime    PRN Meds:    HOME MEDICATIONS:  allopurinol 100 mg oral tablet: 1 tab(s) orally once a day  amLODIPine 5 mg oral tablet: 1 tab(s) orally once a day  cycloSPORINE 100 mg oral capsule: 1 cap(s) orally once a day  Eliquis 5 mg oral tablet: 1 tab(s) orally 2 times a day  metoprolol tartrate 100 mg oral tablet: 1 tab(s) orally 2 times a day  mycophenolate mofetil 500 mg oral tablet: 1 tab(s) orally 2 times a day  omeprazole 40 mg oral delayed release capsule: 1 cap(s) orally once a day  prazosin 2 mg oral capsule: 1 cap(s) orally once a day (at bedtime)  predniSONE 5 mg oral tablet: 1 tab(s) orally once a day      Vital Signs:   T(F): 98.1 (19 @ 05:51), Max: 98.1 (19 @ 05:51)  HR: 71 (19 @ 05:51) (62 - 71)  BP: 121/81 (19 @ 05:51) (121/81 - 162/83)  RR: 18 (19 @ 05:51) (18 - 18)  SpO2: 98% (19 @ 08:01) (98% - 98%)        Physical Exam:   GENERAL: NAD  HEENT: NCAT  CHEST/LUNG: CTAB  HEART: Regular rate and rhythm; s1 s2 appreciated, No murmurs, rubs, or gallops  ABDOMEN: Soft, Nontender, Nondistended; Bowel sounds present  EXTREMITIES: No LE edema b/l  NERVOUS SYSTEM:  Alert & Oriented X3        Labs:                         12.3   4.30  )-----------( 104      ( 2019 05:58 )             38.2     Neutophil% 50.7, Lymphocyte% 31.9, Monocyte% 13.0, Bands% 2.3 19 @ 05:58    2019 05:58    139    |  100    |  25     ----------------------------<  100    4.1     |  22     |  1.3      Ca    9.1        2019 05:58  Mg     2.0       2019 05:58          Amylase --, Lipase 23, 19 @ 14:40    Urinalysis Basic - ( 2019 08:54 )    Color: Yellow / Appearance: Clear / S.013 / pH: x  Gluc: x / Ketone: Negative  / Bili: Negative / Urobili: 3 mg/dL   Blood: x / Protein: Trace / Nitrite: Negative   Leuk Esterase: Negative / RBC: x / WBC x   Sq Epi: x / Non Sq Epi: x / Bacteria: x    Culture - Blood (collected 19 @ 12:41)  Source: .Blood Blood  Preliminary Report (19 @ 23:01):    No growth to date.    Radiology:   None Today    Assessment and Plan:   This is a 54 year old male with PMHx of HTN, ESRD s/p Kidney transplant, DVT on eliquis, BPH who presented with three day history of fevers    #Sepsis secondary to UTI  - Noted E. Coli pansensitive per conversation with [facility]  - Cultures negative since admission to [facility]  - Continue with Meropenem for now; ID consulted for further managment   - Continues to be afebrile    #CKD stage 3A s/p Renal transplant  - Continue to monitor creatinine  - Today 1.3; baseline up to 1.6  - Continue to avoid nephrotoxic medications   - Given renal transplant; continue on Cyclosporin, Prednisone and Cellcept Hospital Day:  3d    Subjective:    Patient is a 54y old  Male who presents with a chief complaint of fever and urinary symptoms (2019 17:11)    No overnight events. Seen and examined at bedside. Patient reported feeling a lot better today than when he came in. He has been ambulating around the floor and tolerating PO diet. Twelve point review of systems was negative.     Past Medical Hx:   HTN (hypertension)  Kidney transplanted    Past Sx:  Transplanted kidney    Allergies:  No Known Allergies    Current Meds:   [facility] Meds:  allopurinol 100 milliGRAM(s) Oral daily  amLODIPine   Tablet 5 milliGRAM(s) Oral daily  apixaban 5 milliGRAM(s) Oral every 12 hours  chlorhexidine 4% Liquid 1 Application(s) Topical <User Schedule>  cycloSPORINE  (SandIMMUNE) 100 milliGRAM(s) Oral daily  cycloSPORINE  (SandIMMUNE) 75 milliGRAM(s) Oral at bedtime  influenza   Vaccine 0.5 milliLiter(s) IntraMuscular once  meropenem  IVPB 1000 milliGRAM(s) IV Intermittent every 12 hours  metoprolol tartrate 100 milliGRAM(s) Oral two times a day  mycophenolate mofetil 500 milliGRAM(s) Oral two times a day  pantoprazole    Tablet 40 milliGRAM(s) Oral before breakfast  predniSONE   Tablet 5 milliGRAM(s) Oral daily  tamsulosin 0.4 milliGRAM(s) Oral at bedtime    PRN Meds:    HOME MEDICATIONS:  allopurinol 100 mg oral tablet: 1 tab(s) orally once a day  amLODIPine 5 mg oral tablet: 1 tab(s) orally once a day  cycloSPORINE 100 mg oral capsule: 1 cap(s) orally once a day  Eliquis 5 mg oral tablet: 1 tab(s) orally 2 times a day  metoprolol tartrate 100 mg oral tablet: 1 tab(s) orally 2 times a day  mycophenolate mofetil 500 mg oral tablet: 1 tab(s) orally 2 times a day  omeprazole 40 mg oral delayed release capsule: 1 cap(s) orally once a day  prazosin 2 mg oral capsule: 1 cap(s) orally once a day (at bedtime)  predniSONE 5 mg oral tablet: 1 tab(s) orally once a day      Vital Signs:   T(F): 98.1 (19 @ 05:51), Max: 98.1 (19 @ 05:51)  HR: 71 (19 @ 05:51) (62 - 71)  BP: 121/81 (19 @ 05:51) (121/81 - 162/83)  RR: 18 (19 @ 05:51) (18 - 18)  SpO2: 98% (19 @ 08:01) (98% - 98%)        Physical Exam:   GENERAL: NAD, Age appropriate male lying in bed pleasant and conversive.   HEENT: NCAT, PERRLA, EOMI  CHEST/LUNG: Clear to auscultation, No wheezing, rhonchi, rales   HEART: Regular rate and rhythm; s1 s2 appreciated, No murmurs, rubs, or gallops  ABDOMEN: Soft, Nontender, Nondistended; Bowel sounds present, Abdominal transplanted kidney palpated   EXTREMITIES: No LE edema b/l, Peripheral pulses 2+, No cyanosis, No clubbing, LUE with noted scar from prior AV fistula that has been removed  NERVOUS SYSTEM:  Alert & Oriented X3, Non focal    Labs:                         12.3   4.30  )-----------( 104      ( 2019 05:58 )             38.2     Neutophil% 50.7, Lymphocyte% 31.9, Monocyte% 13.0, Bands% 2.3 19 @ 05:58    2019 05:58    139    |  100    |  25     ----------------------------<  100    4.1     |  22     |  1.3      Ca    9.1        2019 05:58  Mg     2.0       2019 05:58    Amylase --, Lipase 23, 19 @ 14:40    Urinalysis Basic - ( 2019 08:54 )    Color: Yellow / Appearance: Clear / S.013 / pH: x  Gluc: x / Ketone: Negative  / Bili: Negative / Urobili: 3 mg/dL   Blood: x / Protein: Trace / Nitrite: Negative   Leuk Esterase: Negative / RBC: x / WBC x   Sq Epi: x / Non Sq Epi: x / Bacteria: x    Culture - Blood (collected 19 @ 12:41)  Source: .Blood Blood  Preliminary Report (19 @ 23:01):    No growth to date.    Radiology:   None Today    Assessment and Plan:   This is a 54 year old male with PMHx of HTN, ESRD s/p Kidney transplant, DVT on eliquis, BPH who presented with three day history of fevers    #Sepsis secondary to UTI  - Noted E. Coli pansensitive per conversation with [facility]  - Cultures negative since admission to [facility]  - Continue with Meropenem for now; ID consulted for further managment   - Continues to be afebrile    #CKD stage 3A s/p Renal transplant  - Continue to monitor creatinine  - Today 1.3; baseline up to 1.6  - Continue to avoid nephrotoxic medications   - Given renal transplant; continue on Cyclosporin, Prednisone and Cellcept    #H/o BPH  - Continue on Tamsulosin while inpatient     #HTN  - Continue on Amlodipine    #h/o DVT   - Continue on Eliquis     Activity: As tolerated  Diet: DASH  DVT ppx: on eliquis  GI ppx: protonix  Code Status: Full Code  DISPO: From home; pending ID for discharge

## 2019-11-28 NOTE — PROGRESS NOTE ADULT - SUBJECTIVE AND OBJECTIVE BOX
pt. seen and examined at bedside in [facility], voice no complaints states urine is clear today, no difficulty on urination. Afebrile.      Vital Signs Last 24 Hrs  T(C): 36.7 (28 Nov 2019 05:51), Max: 36.7 (28 Nov 2019 05:51)  T(F): 98.1 (28 Nov 2019 05:51), Max: 98.1 (28 Nov 2019 05:51)  HR: 71 (28 Nov 2019 05:51) (62 - 71)  BP: 121/81 (28 Nov 2019 05:51) (121/81 - 162/83)  RR: 18 (28 Nov 2019 05:51) (18 - 18)  SpO2: 98% (28 Nov 2019 08:01) (98% - 98%)    MEDICATIONS  (STANDING):  allopurinol 100 milliGRAM(s) Oral daily  amLODIPine   Tablet 5 milliGRAM(s) Oral daily  apixaban 5 milliGRAM(s) Oral every 12 hours  chlorhexidine 4% Liquid 1 Application(s) Topical <User Schedule>  cycloSPORINE  (SandIMMUNE) 100 milliGRAM(s) Oral daily  cycloSPORINE  (SandIMMUNE) 75 milliGRAM(s) Oral at bedtime  influenza   Vaccine 0.5 milliLiter(s) IntraMuscular once  meropenem  IVPB 1000 milliGRAM(s) IV Intermittent every 12 hours  metoprolol tartrate 100 milliGRAM(s) Oral two times a day  mycophenolate mofetil 500 milliGRAM(s) Oral two times a day  pantoprazole    Tablet 40 milliGRAM(s) Oral before breakfast  predniSONE   Tablet 5 milliGRAM(s) Oral daily  tamsulosin 0.4 milliGRAM(s) Oral at bedtime       PE:  General: WN/WD in NAD  HEENT: NC/AT, EOMI   Abd: Obese, mildly distended no ttp over Suprapubic region   Extremities: MAEx 4        LABS:                        12.3   4.30  )-----------( 104      ( 28 Nov 2019 05:58 )             38.2     11-28  < from: US Retroperitoneal Complete (11.25.19 @ 18:36) >  IMPRESSION:  No stones or hydronephrosis.    Atrophic native kidneys.    Right iliac fossa transplant kidney is normal in appearance, without   stones or hydronephrosis.    Post void residual volume of 159 cc.          < end of copied text >    139  |  100  |  25<H>  ----------------------------<  100<H>  4.1   |  22  |  1.3    Ca    9.1      28 Nov 2019 05:58  Mg     2.0     11-28      Culture - Urine (11.26.19 @ 08:54)    Specimen Source: .Urine Clean Catch (Midstream)    Culture Results:   No growth        RADIOLOGY & ADDITIONAL STUDIES:

## 2019-11-28 NOTE — PROGRESS NOTE ADULT - PROBLEM SELECTOR PLAN 1
I asked him to f/up with me for cystoscopy  He will need Urodynamics  Please send urine for urine cytology  continue tamsulosin  Renal sonogram WNL.
as above

## 2019-11-28 NOTE — PROGRESS NOTE ADULT - ASSESSMENT
1. UTI  2. Bacteremia/septicemia, E coli as outpatient  3. s/p  Kidney transplant, creatinine at baseline  4. 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 HTN    Plan:      Continue meropenem  Followup blood and urine cultures (clear so far)  ID f/u  F/U cyclosporine level  resume mycophenolate  500 g bid   Magnesium supplementation  Daily BMP

## 2019-11-28 NOTE — PROGRESS NOTE ADULT - ASSESSMENT
54 y.o M with BPH  associated w LUTS.   denies hematuria today    Plan   No acute  intervention needed   Cont. Flomax  F/U urine cytology   F/U with Dr. Gomes as an outpatient for Urodynamic studies and further work up.

## 2019-11-28 NOTE — PROGRESS NOTE ADULT - SUBJECTIVE AND OBJECTIVE BOX
[facility] FOLLOW UP NOTE  --------------------------------------------------------------------------------  Chief Complaint/24 hour events/subjective:    pt seen, no fever    PAST HISTORY  --------------------------------------------------------------------------------  No significant changes to PMH, PSH, FHx, SHx, unless otherwise noted    ALLERGIES & MEDICATIONS  --------------------------------------------------------------------------------  Allergies    No Known Allergies    Intolerances      Standing Inpatient Medications  allopurinol 100 milliGRAM(s) Oral daily  amLODIPine   Tablet 5 milliGRAM(s) Oral daily  apixaban 5 milliGRAM(s) Oral every 12 hours  chlorhexidine 4% Liquid 1 Application(s) Topical <User Schedule>  cycloSPORINE  (SandIMMUNE) 100 milliGRAM(s) Oral daily  cycloSPORINE  (SandIMMUNE) 75 milliGRAM(s) Oral at bedtime  influenza   Vaccine 0.5 milliLiter(s) IntraMuscular once  meropenem  IVPB 1000 milliGRAM(s) IV Intermittent every 12 hours  metoprolol tartrate 100 milliGRAM(s) Oral two times a day  mycophenolate mofetil 500 milliGRAM(s) Oral two times a day  pantoprazole    Tablet 40 milliGRAM(s) Oral before breakfast  predniSONE   Tablet 5 milliGRAM(s) Oral daily  tamsulosin 0.4 milliGRAM(s) Oral at bedtime    PRN Inpatient Medications      REVIEW OF SYSTEMS  --------------------------------------------------------------------------------    All other systems were reviewed and are negative, except as noted.    VITALS/PHYSICAL EXAM  --------------------------------------------------------------------------------  T(C): 36.7 (11-28-19 @ 05:51), Max: 36.7 (11-28-19 @ 05:51)  HR: 71 (11-28-19 @ 05:51) (62 - 71)  BP: 121/81 (11-28-19 @ 05:51) (121/81 - 162/83)  RR: 18 (11-28-19 @ 05:51) (18 - 18)  SpO2: 98% (11-28-19 @ 08:01) (98% - 98%)  Wt(kg): --  Height (cm): 167.6 (11-27-19 @ 14:37)  Weight (kg): 97.8 (11-27-19 @ 14:37)  BMI (kg/m2): 34.8 (11-27-19 @ 14:37)  BSA (m2): 2.06 (11-27-19 @ 14:37)      Physical Exam:    	Gen: no distress   	Pulm: CTA B/L  	CV: S1S2; no rub  	Abd: +BS, soft, nontender/nondistended  	LE:  no  edema      LABS/STUDIES  --------------------------------------------------------------------------------              12.3   4.30  >-----------<  104      [11-28-19 @ 05:58]              38.2     139  |  100  |  25  ----------------------------<  100      [11-28-19 @ 05:58]  4.1   |  22  |  1.3        Ca     9.1     [11-28-19 @ 05:58]      Mg     2.0     [11-28-19 @ 05:58]            Creatinine Trend:  SCr 1.3 [11-28 @ 05:58]  SCr 1.4 [11-27 @ 07:47]  SCr 1.6 [11-25 @ 14:40]    Urinalysis - [11-26-19 @ 08:54]      Color Yellow / Appearance Clear / SG 1.013 / pH 6.5      Gluc Negative / Ketone Negative  / Bili Negative / Urobili 3 mg/dL       Blood Negative / Protein Trace / Leuk Est Negative / Nitrite Negative      RBC  / WBC  / Hyaline  / Gran  / Sq Epi  / Non Sq Epi  / Bacteria

## 2019-11-29 ENCOUNTER — TRANSCRIPTION ENCOUNTER (OUTPATIENT)
Age: 54
End: 2019-11-29

## 2019-11-29 LAB
ANION GAP SERPL CALC-SCNC: 16 MMOL/L — HIGH (ref 7–14)
BASOPHILS # BLD AUTO: 0.02 K/UL — SIGNIFICANT CHANGE UP (ref 0–0.2)
BASOPHILS NFR BLD AUTO: 0.4 % — SIGNIFICANT CHANGE UP (ref 0–1)
BUN SERPL-MCNC: 28 MG/DL — HIGH (ref 10–20)
CALCIUM SERPL-MCNC: 9.3 MG/DL — SIGNIFICANT CHANGE UP (ref 8.5–10.1)
CHLORIDE SERPL-SCNC: 103 MMOL/L — SIGNIFICANT CHANGE UP (ref 98–110)
CO2 SERPL-SCNC: 23 MMOL/L — SIGNIFICANT CHANGE UP (ref 17–32)
CREAT SERPL-MCNC: 1.3 MG/DL — SIGNIFICANT CHANGE UP (ref 0.7–1.5)
EOSINOPHIL # BLD AUTO: 0.07 K/UL — SIGNIFICANT CHANGE UP (ref 0–0.7)
EOSINOPHIL NFR BLD AUTO: 1.4 % — SIGNIFICANT CHANGE UP (ref 0–8)
GLUCOSE SERPL-MCNC: 104 MG/DL — HIGH (ref 70–99)
HCT VFR BLD CALC: 36.9 % — LOW (ref 42–52)
HGB BLD-MCNC: 11.7 G/DL — LOW (ref 14–18)
IMM GRANULOCYTES NFR BLD AUTO: 3.1 % — HIGH (ref 0.1–0.3)
LYMPHOCYTES # BLD AUTO: 2 K/UL — SIGNIFICANT CHANGE UP (ref 1.2–3.4)
LYMPHOCYTES # BLD AUTO: 40.7 % — SIGNIFICANT CHANGE UP (ref 20.5–51.1)
MAGNESIUM SERPL-MCNC: 1.7 MG/DL — LOW (ref 1.8–2.4)
MCHC RBC-ENTMCNC: 28.7 PG — SIGNIFICANT CHANGE UP (ref 27–31)
MCHC RBC-ENTMCNC: 31.7 G/DL — LOW (ref 32–37)
MCV RBC AUTO: 90.7 FL — SIGNIFICANT CHANGE UP (ref 80–94)
MONOCYTES # BLD AUTO: 0.45 K/UL — SIGNIFICANT CHANGE UP (ref 0.1–0.6)
MONOCYTES NFR BLD AUTO: 9.2 % — SIGNIFICANT CHANGE UP (ref 1.7–9.3)
NEUTROPHILS # BLD AUTO: 2.22 K/UL — SIGNIFICANT CHANGE UP (ref 1.4–6.5)
NEUTROPHILS NFR BLD AUTO: 45.2 % — SIGNIFICANT CHANGE UP (ref 42.2–75.2)
NRBC # BLD: 0 /100 WBCS — SIGNIFICANT CHANGE UP (ref 0–0)
PLATELET # BLD AUTO: 118 K/UL — LOW (ref 130–400)
POTASSIUM SERPL-MCNC: 4.1 MMOL/L — SIGNIFICANT CHANGE UP (ref 3.5–5)
POTASSIUM SERPL-SCNC: 4.1 MMOL/L — SIGNIFICANT CHANGE UP (ref 3.5–5)
RBC # BLD: 4.07 M/UL — LOW (ref 4.7–6.1)
RBC # FLD: 12.1 % — SIGNIFICANT CHANGE UP (ref 11.5–14.5)
SODIUM SERPL-SCNC: 142 MMOL/L — SIGNIFICANT CHANGE UP (ref 135–146)
WBC # BLD: 4.91 K/UL — SIGNIFICANT CHANGE UP (ref 4.8–10.8)
WBC # FLD AUTO: 4.91 K/UL — SIGNIFICANT CHANGE UP (ref 4.8–10.8)

## 2019-11-29 PROCEDURE — 99233 SBSQ HOSP IP/OBS HIGH 50: CPT

## 2019-11-29 RX ORDER — MAGNESIUM SULFATE 500 MG/ML
2 VIAL (ML) INJECTION ONCE
Refills: 0 | Status: COMPLETED | OUTPATIENT
Start: 2019-11-29 | End: 2019-11-29

## 2019-11-29 RX ORDER — CEFTRIAXONE 500 MG/1
2000 INJECTION, POWDER, FOR SOLUTION INTRAMUSCULAR; INTRAVENOUS EVERY 24 HOURS
Refills: 0 | Status: COMPLETED | OUTPATIENT
Start: 2019-11-29 | End: 2019-12-01

## 2019-11-29 RX ADMIN — APIXABAN 5 MILLIGRAM(S): 2.5 TABLET, FILM COATED ORAL at 17:26

## 2019-11-29 RX ADMIN — Medication 100 MILLIGRAM(S): at 12:11

## 2019-11-29 RX ADMIN — PANTOPRAZOLE SODIUM 40 MILLIGRAM(S): 20 TABLET, DELAYED RELEASE ORAL at 05:30

## 2019-11-29 RX ADMIN — TAMSULOSIN HYDROCHLORIDE 0.4 MILLIGRAM(S): 0.4 CAPSULE ORAL at 21:02

## 2019-11-29 RX ADMIN — MYCOPHENOLATE MOFETIL 500 MILLIGRAM(S): 250 CAPSULE ORAL at 09:03

## 2019-11-29 RX ADMIN — CEFTRIAXONE 100 MILLIGRAM(S): 500 INJECTION, POWDER, FOR SOLUTION INTRAMUSCULAR; INTRAVENOUS at 13:10

## 2019-11-29 RX ADMIN — Medication 5 MILLIGRAM(S): at 05:29

## 2019-11-29 RX ADMIN — CYCLOSPORINE 75 MILLIGRAM(S): 100 CAPSULE ORAL at 21:01

## 2019-11-29 RX ADMIN — Medication 100 MILLIGRAM(S): at 17:26

## 2019-11-29 RX ADMIN — CYCLOSPORINE 100 MILLIGRAM(S): 100 CAPSULE ORAL at 09:05

## 2019-11-29 RX ADMIN — APIXABAN 5 MILLIGRAM(S): 2.5 TABLET, FILM COATED ORAL at 05:29

## 2019-11-29 RX ADMIN — AMLODIPINE BESYLATE 5 MILLIGRAM(S): 2.5 TABLET ORAL at 05:29

## 2019-11-29 RX ADMIN — MEROPENEM 100 MILLIGRAM(S): 1 INJECTION INTRAVENOUS at 05:30

## 2019-11-29 RX ADMIN — Medication 100 MILLIGRAM(S): at 05:29

## 2019-11-29 RX ADMIN — Medication 50 GRAM(S): at 12:09

## 2019-11-29 RX ADMIN — MYCOPHENOLATE MOFETIL 500 MILLIGRAM(S): 250 CAPSULE ORAL at 21:01

## 2019-11-29 NOTE — CHART NOTE - NSCHARTNOTEFT_GEN_A_CORE
Spoke with radiology; patient had US that showed no obstructive uropathy on 11/25. CT not needed at this time.

## 2019-11-29 NOTE — PROGRESS NOTE ADULT - SUBJECTIVE AND OBJECTIVE BOX
[facility] FOLLOW UP NOTE  --------------------------------------------------------------------------------  Chief Complaint/24 hour events/subjective:    no fever    PAST HISTORY  --------------------------------------------------------------------------------  No significant changes to PMH, PSH, FHx, SHx, unless otherwise noted    ALLERGIES & MEDICATIONS  --------------------------------------------------------------------------------  Allergies    No Known Allergies    Intolerances      Standing Inpatient Medications  allopurinol 100 milliGRAM(s) Oral daily  amLODIPine   Tablet 5 milliGRAM(s) Oral daily  apixaban 5 milliGRAM(s) Oral every 12 hours  cefTRIAXone   IVPB 2000 milliGRAM(s) IV Intermittent every 24 hours  chlorhexidine 4% Liquid 1 Application(s) Topical <User Schedule>  cycloSPORINE  (SandIMMUNE) 100 milliGRAM(s) Oral daily  cycloSPORINE  (SandIMMUNE) 75 milliGRAM(s) Oral at bedtime  influenza   Vaccine 0.5 milliLiter(s) IntraMuscular once  metoprolol tartrate 100 milliGRAM(s) Oral two times a day  mycophenolate mofetil 500 milliGRAM(s) Oral two times a day  pantoprazole    Tablet 40 milliGRAM(s) Oral before breakfast  predniSONE   Tablet 5 milliGRAM(s) Oral daily  tamsulosin 0.4 milliGRAM(s) Oral at bedtime    PRN Inpatient Medications      REVIEW OF SYSTEMS  --------------------------------------------------------------------------------    All other systems were reviewed and are negative, except as noted.    VITALS/PHYSICAL EXAM  --------------------------------------------------------------------------------  T(C): 37.1 (11-29-19 @ 05:00), Max: 37.1 (11-29-19 @ 05:00)  HR: 67 (11-29-19 @ 05:00) (67 - 70)  BP: 122/72 (11-29-19 @ 05:00) (120/51 - 129/79)  RR: 18 (11-29-19 @ 05:00) (18 - 18)  SpO2: 97% (11-29-19 @ 08:11) (97% - 97%)  Wt(kg): --  Height (cm): 167.6 (11-27-19 @ 14:37)  Weight (kg): 97.8 (11-27-19 @ 14:37)  BMI (kg/m2): 34.8 (11-27-19 @ 14:37)  BSA (m2): 2.06 (11-27-19 @ 14:37)      Physical Exam:    	Gen: no distress   	Pulm: CTA B/L  	CV: S1S2; no rub  	Abd: +BS, soft, nontender/nondistended  	LE:  no  edema      LABS/STUDIES  --------------------------------------------------------------------------------              11.7   4.91  >-----------<  118      [11-29-19 @ 04:30]              36.9     142  |  103  |  28  ----------------------------<  104      [11-29-19 @ 04:30]  4.1   |  23  |  1.3        Ca     9.3     [11-29-19 @ 04:30]      Mg     1.7     [11-29-19 @ 04:30]            Creatinine Trend:  SCr 1.3 [11-29 @ 04:30]  SCr 1.3 [11-28 @ 05:58]  SCr 1.4 [11-27 @ 07:47]  SCr 1.6 [11-25 @ 14:40]    Urinalysis - [11-26-19 @ 08:54]      Color Yellow / Appearance Clear / SG 1.013 / pH 6.5      Gluc Negative / Ketone Negative  / Bili Negative / Urobili 3 mg/dL       Blood Negative / Protein Trace / Leuk Est Negative / Nitrite Negative      RBC  / WBC  / Hyaline  / Gran  / Sq Epi  / Non Sq Epi  / Bacteria

## 2019-11-29 NOTE — DISCHARGE NOTE PROVIDER - HOSPITAL COURSE
54 year old male with history of kidney transplant ( 14 years ago ), HTN, DVT on Eliquis, and  BPH presents with fever for 3 days duration. 3 days prior to presentation when started to have fever and chills associated with red urine and burning sensation with urination. Was seen in [facility] ED  and started on Cefdinir, fever has not resolved and patient got call back from the ED as his culture - not sure source - came back positive. He presented to [facility] and JORGE was contacted who advised the patient had a pansensitive E. Coli. The patient was started on IV antibiotics and will complete a course of PO Vantin. Will need outpatient follow up with uriology.

## 2019-11-29 NOTE — PROGRESS NOTE ADULT - ASSESSMENT
1. UTI  2. Bacteremia/septicemia, E coli as outpatient  3. s/p  Kidney transplant, creatinine at baseline  4. 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 HTN    Plan:      Continue meropenem IV,   ID f/u  F/U cyclosporine level  continue cyclosporine and  mycophenolate  500 g bid   Magnesium supplementation  Daily BMP

## 2019-11-29 NOTE — PROGRESS NOTE ADULT - ASSESSMENT
54 year old male with PMHx of HTN, ESRD s/p Kidney transplant, DVT on eliquis, BPH who presented with three day history of fevers    #sepsis 2/2 UTI  E. Coli pansensitive per outside hospital  bcx and urine culture no growth to date   -per ID change to Rocephin 2g q24h (change to vantin 200mg q12h for 14 days if CT abd/pelvis negative for obstructive uropathy)  -check CT abd/pelvis to rule out obstructive uropathy     #CKD stage 3A s/p renal transplant  Creatinine, Serum: 1.3 mg/dL (11.29.19 @ 04:30)  stable  -nephro on consult  -cont cyclosporine, prednisone, and mycophenolate   -f/u cyclosporine levels  -trend BMP daily  -avoid nephrotoxic agents when possible  -renally dose all medications     #BPH  -cont tamsulosin     #HTN  well controlled  -cont norvasc     #h/o DVT   -cont eliquis     Progress Note Handoff  Pending:  CT abd/pelvis  Patient/Family discussion: spoke with pt at bedside he states that he does not feel comfortable with a saturday discharge despite feeling much better and would prefer sunday when his niece can pick him up. all other questions and concerns addressed  Disposition: home sunday with niece

## 2019-11-29 NOTE — PROGRESS NOTE ADULT - SUBJECTIVE AND OBJECTIVE BOX
LORENE TOBIN  54y, Male  Allergy: No Known Allergies  Hospital Day: 4d      Patient was seen and examined sitting in chair at bedside. he states that he feels much better today but is very anxious about being discharged tomorrow and agreeable on sunday. denies fevers, chills, CP, SOB, palpitations, abdominal pain/flank pain. tolerating diet okay          VITALS:  T(F): 98.7 (11-29-19 @ 05:00), Max: 98.7 (11-29-19 @ 05:00)  HR: 67 (11-29-19 @ 05:00)  BP: 122/72 (11-29-19 @ 05:00) (120/51 - 129/79)  RR: 18 (11-29-19 @ 05:00)  SpO2: 97% (11-29-19 @ 08:11)    PHYSICAL EXAM:  GENERAL: well developed well nourished in no acute distress  NECK: No evidence of swelling no palpable lymphadenopathy  CHEST/LUNG: clear to ausculation bilaterally no wheezes, rales, or rhonchi   HEART/VASC: RRR, No murmurs, rubs, or gallops appreciated, 2+ equal bilateral radial pulse  ABDOMEN: Soft, non tender non distended +bowel sounds in all quadrants, no palpable organomegaly   EXTREMITIES:  No clubbing and range of motion intact     TESTS & MEASUREMENTS:  Weight (Kg): 97.8 (11-27-19 @ 14:37)  BMI (kg/m2): 34.8 (11-27)                          11.7   4.91  )-----------( 118      ( 29 Nov 2019 04:30 )             36.9       11-29    142  |  103  |  28<H>  ----------------------------<  104<H>  4.1   |  23  |  1.3    Ca    9.3      29 Nov 2019 04:30  Mg     1.7     11-29              Culture - Blood (collected 11-28-19 @ 05:58)  Source: .Blood None  Preliminary Report (11-29-19 @ 11:01):    No growth to date.    Culture - Urine (collected 11-26-19 @ 08:54)  Source: .Urine Clean Catch (Midstream)  Final Report (11-27-19 @ 16:17):    No growth    Culture - Urine (collected 11-25-19 @ 18:00)  Source: .Urine Clean Catch (Midstream)  Final Report (11-27-19 @ 01:50):    No growth    Culture - Urine (collected 11-25-19 @ 12:41)  Source: .Urine Clean Catch (Midstream)  Final Report (11-27-19 @ 00:14):    No growth    Culture - Blood (collected 11-25-19 @ 12:41)  Source: .Blood Blood  Preliminary Report (11-26-19 @ 23:01):    No growth to date.      MEDICATIONS:  MEDICATIONS  (STANDING):  allopurinol 100 milliGRAM(s) Oral daily  amLODIPine   Tablet 5 milliGRAM(s) Oral daily  apixaban 5 milliGRAM(s) Oral every 12 hours  cefTRIAXone   IVPB 2000 milliGRAM(s) IV Intermittent every 24 hours  chlorhexidine 4% Liquid 1 Application(s) Topical <User Schedule>  cycloSPORINE  (SandIMMUNE) 100 milliGRAM(s) Oral daily  cycloSPORINE  (SandIMMUNE) 75 milliGRAM(s) Oral at bedtime  influenza   Vaccine 0.5 milliLiter(s) IntraMuscular once  metoprolol tartrate 100 milliGRAM(s) Oral two times a day  mycophenolate mofetil 500 milliGRAM(s) Oral two times a day  pantoprazole    Tablet 40 milliGRAM(s) Oral before breakfast  predniSONE   Tablet 5 milliGRAM(s) Oral daily  tamsulosin 0.4 milliGRAM(s) Oral at bedtime

## 2019-11-29 NOTE — DISCHARGE NOTE PROVIDER - NSDCMRMEDTOKEN_GEN_ALL_CORE_FT
allopurinol 100 mg oral tablet: 1 tab(s) orally once a day  amLODIPine 5 mg oral tablet: 1 tab(s) orally once a day  cefpodoxime 200 mg oral tablet: 1 tab(s) orally 2 times a day   cycloSPORINE 100 mg oral capsule: 1 cap(s) orally once a day  Eliquis 5 mg oral tablet: 1 tab(s) orally 2 times a day  metoprolol tartrate 100 mg oral tablet: 1 tab(s) orally 2 times a day  mycophenolate mofetil 500 mg oral tablet: 1 tab(s) orally 2 times a day  omeprazole 40 mg oral delayed release capsule: 1 cap(s) orally once a day  prazosin 2 mg oral capsule: 1 cap(s) orally once a day (at bedtime)  predniSONE 5 mg oral tablet: 1 tab(s) orally once a day

## 2019-11-29 NOTE — CONSULT NOTE ADULT - SUBJECTIVE AND OBJECTIVE BOX
LORENE TOBIN  54y, Male  Allergy: No Known Allergies      All historical available data reviewed.    HPI:  54 year old male with history of kidney transplant ( 14 years ago ), HTN, DVT on Eliquis, and  BPH presents with fever for 3 days duration.   Patient was doing well until 3 days prior to presentation when started to have fever and chills associated with red urine and burning sensation with urination. Was seen in [facility] ED  and started on Cefdinir, fever has not resolved and patient got call back from the ED as his culture - not sure source - came back positive. Was not told which type of bacteria.   Symptoms has not resolved since started the Abx, Reports obstructing urinary symptoms for the last month including; hesitancy, straining, slow flow  frequent urination, continuos feeling of full bladder and dysuria.  In ED Temp 102.4, 103, 135/89, and RR18. admitted for Sepsis secondary to Pyelonephritis. (25 Nov 2019 16:29)  Presently no fevers and no complaints  ID called for ABx management    FAMILY HISTORY:  FH: kidney disease    PAST MEDICAL & SURGICAL HISTORY:  HTN (hypertension)  Kidney transplanted  Transplanted kidney        VITALS:  T(F): 98.7, Max: 98.7 (11-29-19 @ 05:00)  HR: 67  BP: 122/72  RR: 18Vital Signs Last 24 Hrs  T(C): 37.1 (29 Nov 2019 05:00), Max: 37.1 (29 Nov 2019 05:00)  T(F): 98.7 (29 Nov 2019 05:00), Max: 98.7 (29 Nov 2019 05:00)  HR: 67 (29 Nov 2019 05:00) (67 - 70)  BP: 122/72 (29 Nov 2019 05:00) (120/51 - 129/79)  BP(mean): --  RR: 18 (29 Nov 2019 05:00) (18 - 18)  SpO2: 97% (29 Nov 2019 08:11) (97% - 97%)    TESTS & MEASUREMENTS:                        11.7   4.91  )-----------( 118      ( 29 Nov 2019 04:30 )             36.9     11-29    142  |  103  |  28<H>  ----------------------------<  104<H>  4.1   |  23  |  1.3    Ca    9.3      29 Nov 2019 04:30  Mg     1.7     11-29          Culture - Urine (collected 11-26-19 @ 08:54)  Source: .Urine Clean Catch (Midstream)  Final Report (11-27-19 @ 16:17):    No growth    Culture - Urine (collected 11-25-19 @ 18:00)  Source: .Urine Clean Catch (Midstream)  Final Report (11-27-19 @ 01:50):    No growth    Culture - Urine (collected 11-25-19 @ 12:41)  Source: .Urine Clean Catch (Midstream)  Final Report (11-27-19 @ 00:14):    No growth    Culture - Blood (collected 11-25-19 @ 12:41)  Source: .Blood Blood  Preliminary Report (11-26-19 @ 23:01):    No growth to date.            RADIOLOGY & ADDITIONAL TESTS:  Personal review of radiological diagnostics performed  Echo and EKG results noted when applicable.     ANTIBIOTICS:  meropenem  IVPB 1000 milliGRAM(s) IV Intermittent every 12 hours

## 2019-11-29 NOTE — CONSULT NOTE ADULT - ASSESSMENT
1. UTI  2. ?Bacteremia/septicemia  3. Kidney transplant, creatinine baseline  4. 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 HTN    Plan:    Obtain medical records from [facility]  Continue meropenem  Check blood and urine cultures  ID eval  Check cyclosporine level  Hold mycophenolate for now  Magnesium supplementation  Daily BMP
Pt with c/o dysuria and hematuria upon presentation.  Medium stream, + frequency q 45 min - 1 hr, nocturia x 5-6, + moderate urgency with incontinence, denies hesitancy, straining,   voids in small amounts, + PV fullness and dribbling      A) Moderate lower  urinary tract symptoms.  incomplete bladder emptying   hematuria  UTI  ? + blood cultures    P) start Flomax 0.4mg if not contraindicated   consider repeat sonogram in 72 hrs to check PVR.  OP f/u for LUTS w/u cystoscopy for hematuria.
54 year old male with PMHx of HTN, ESRD s/p Kidney transplant, DVT on eliquis, BPH who presented with three day history of fevers.  11/23 admitted with fevers to [facility] and found to have pansensitive E coli in blood.  D/c'ed on po ABx. Now present with sepsis    IMPRESSION:  Sepsis ( T 102.4, P 103/m ) secondary to acute pyelonephritis  Clinically presently no ongoing sepsis and with no complaints  BCx 11/25 NG  UCx 11/25 NG  No pyuria  WBC 4.9  CXR no infiltrates    RECOMMENDATIONS:  CT abdomen to r/o obstructive uropathy ( given that pt has recurrent sepsis within a week )  Rocephin 2 gm iv q24h  If CT is unremarkable then change rocephin to po vantin 200 mg q12h for 14 more days

## 2019-11-29 NOTE — DISCHARGE NOTE PROVIDER - NSDCCPCAREPLAN_GEN_ALL_CORE_FT
PRINCIPAL DISCHARGE DIAGNOSIS  Diagnosis: Sepsis  Assessment and Plan of Treatment: You were found to have sepsis secondary to suspected E. Coli UTI. You were treated with IV antibiotics and will complete a PO course. An US was performed in the ED showing no obstruction or hydronephrosis. Please follow up with Urology as an outpatient for urodynamic testing.      SECONDARY DISCHARGE DIAGNOSES  Diagnosis: Kidney transplanted  Assessment and Plan of Treatment: Please resume all home medications for renal transplant.

## 2019-11-29 NOTE — PROGRESS NOTE ADULT - SUBJECTIVE AND OBJECTIVE BOX
Hospital Day:  4d    Subjective:    Patient is a 54y old  Male who presents with a chief complaint of fever and urinary symptoms (2019 13:59)    No overnight events. Seen and examined at bedside. The patient reported feeling well today and states he is pretty much back to his baseline. Remainder of review of systems otherwise negative.     Past Medical Hx:   HTN (hypertension)  Kidney transplanted    Past Sx:  Transplanted kidney    Allergies:  No Known Allergies    Current Meds:   [facility] Meds:  allopurinol 100 milliGRAM(s) Oral daily  amLODIPine   Tablet 5 milliGRAM(s) Oral daily  apixaban 5 milliGRAM(s) Oral every 12 hours  chlorhexidine 4% Liquid 1 Application(s) Topical <User Schedule>  cycloSPORINE  (SandIMMUNE) 100 milliGRAM(s) Oral daily  cycloSPORINE  (SandIMMUNE) 75 milliGRAM(s) Oral at bedtime  influenza   Vaccine 0.5 milliLiter(s) IntraMuscular once  meropenem  IVPB 1000 milliGRAM(s) IV Intermittent every 12 hours  metoprolol tartrate 100 milliGRAM(s) Oral two times a day  mycophenolate mofetil 500 milliGRAM(s) Oral two times a day  pantoprazole    Tablet 40 milliGRAM(s) Oral before breakfast  predniSONE   Tablet 5 milliGRAM(s) Oral daily  tamsulosin 0.4 milliGRAM(s) Oral at bedtime    PRN Meds:    HOME MEDICATIONS:  allopurinol 100 mg oral tablet: 1 tab(s) orally once a day  amLODIPine 5 mg oral tablet: 1 tab(s) orally once a day  cycloSPORINE 100 mg oral capsule: 1 cap(s) orally once a day  Eliquis 5 mg oral tablet: 1 tab(s) orally 2 times a day  metoprolol tartrate 100 mg oral tablet: 1 tab(s) orally 2 times a day  mycophenolate mofetil 500 mg oral tablet: 1 tab(s) orally 2 times a day  omeprazole 40 mg oral delayed release capsule: 1 cap(s) orally once a day  prazosin 2 mg oral capsule: 1 cap(s) orally once a day (at bedtime)  predniSONE 5 mg oral tablet: 1 tab(s) orally once a day      Vital Signs:   T(F): 98.7 (19 @ 05:00), Max: 98.7 (19 @ 05:00)  HR: 67 (19 @ 05:00) (67 - 70)  BP: 122/72 (19 @ 05:00) (120/51 - 129/79)  RR: 18 (19 @ 05:00) (18 - 18)  SpO2: 98% (19 @ 08:01) (98% - 98%)    Physical Exam:   GENERAL: NAD, Age appropriate male lying in bed pleasant and conversive.   HEENT: NCAT, PERRLA, EOMI  CHEST/LUNG: Clear to auscultation, No wheezing, rhonchi, rales   HEART: Regular rate and rhythm; s1 s2 appreciated, No murmurs, rubs, or gallops  ABDOMEN: Soft, Nontender, Nondistended; Bowel sounds present, Abdominal transplanted kidney palpated   EXTREMITIES: No LE edema b/l, Peripheral pulses 2+, No cyanosis, No clubbing, LUE with noted scar from prior AV fistula that has been removed  NERVOUS SYSTEM:  Alert & Oriented X3, Non focal    Labs:                         11.7   4.91  )-----------( 118      ( 2019 04:30 )             36.9     Neutophil% 45.2, Lymphocyte% 40.7, Monocyte% 9.2, Bands% 3.1 19 @ 04:30    2019 04:30    142    |  103    |  28     ----------------------------<  104    4.1     |  23     |  1.3      Ca    9.3        2019 04:30  Mg     1.7       2019 04:30    Amylase --, Lipase 23, 19 @ 14:40    Urinalysis Basic - ( 2019 08:54 )    Color: Yellow / Appearance: Clear / S.013 / pH: x  Gluc: x / Ketone: Negative  / Bili: Negative / Urobili: 3 mg/dL   Blood: x / Protein: Trace / Nitrite: Negative   Leuk Esterase: Negative / RBC: x / WBC x   Sq Epi: x / Non Sq Epi: x / Bacteria: x    Culture - Blood (collected 19 @ 12:41)  Source: .Blood Blood  Preliminary Report (19 @ 23:01):    No growth to date.    Radiology:   None Today    Assessment and Plan:   This is a 54 year old male with PMHx of HTN, ESRD s/p Kidney transplant, DVT on eliquis, BPH who presented with three day history of fevers    #Sepsis secondary to UTI  - Noted E. Coli pansensitive per conversation with RWJ  - Cultures negative since admission to [facility]  - Switch from meropenem to rocephin today. Discharge home on PO antibiotics   - Continues to be afebrile    #CKD stage 3A s/p Renal transplant  - Continue to monitor creatinine  - Today 1.3; baseline up to 1.6  - Continue to avoid nephrotoxic medications   - Given renal transplant; continue on Cyclosporin, Prednisone and Cellcept    #H/o BPH  - Continue on Tamsulosin while inpatient     #HTN  - Continue on Amlodipine    #h/o DVT   - Continue on Eliquis     Activity: As tolerated  Diet: DASH  DVT ppx: on eliquis  GI ppx: protonix  Code Status: Full Code  DISPO: From home; anticipate discharge within 24 hours

## 2019-11-29 NOTE — DISCHARGE NOTE PROVIDER - CARE PROVIDER_API CALL
Jefe Gomes)  Urology  [address], [place] [address]  Phone: [phone]  Fax: [phone]  Follow Up Time:     Yasmany Loja; PhD)  Cardiovascular Disease; Interventional Cardiology  [facility] [address] [place] [address], [address] [place] [address]  Phone: [phone]  Fax: [phone]  Follow Up Time:

## 2019-11-30 LAB
ANION GAP SERPL CALC-SCNC: 14 MMOL/L — SIGNIFICANT CHANGE UP (ref 7–14)
BASOPHILS # BLD AUTO: 0.03 K/UL — SIGNIFICANT CHANGE UP (ref 0–0.2)
BASOPHILS NFR BLD AUTO: 0.6 % — SIGNIFICANT CHANGE UP (ref 0–1)
BUN SERPL-MCNC: 32 MG/DL — HIGH (ref 10–20)
CALCIUM SERPL-MCNC: 9.1 MG/DL — SIGNIFICANT CHANGE UP (ref 8.5–10.1)
CHLORIDE SERPL-SCNC: 101 MMOL/L — SIGNIFICANT CHANGE UP (ref 98–110)
CO2 SERPL-SCNC: 24 MMOL/L — SIGNIFICANT CHANGE UP (ref 17–32)
CREAT SERPL-MCNC: 1.6 MG/DL — HIGH (ref 0.7–1.5)
CULTURE RESULTS: SIGNIFICANT CHANGE UP
EOSINOPHIL # BLD AUTO: 0.08 K/UL — SIGNIFICANT CHANGE UP (ref 0–0.7)
EOSINOPHIL NFR BLD AUTO: 1.5 % — SIGNIFICANT CHANGE UP (ref 0–8)
GLUCOSE SERPL-MCNC: 114 MG/DL — HIGH (ref 70–99)
HCT VFR BLD CALC: 36.8 % — LOW (ref 42–52)
HGB BLD-MCNC: 11.8 G/DL — LOW (ref 14–18)
IMM GRANULOCYTES NFR BLD AUTO: 3.6 % — HIGH (ref 0.1–0.3)
LYMPHOCYTES # BLD AUTO: 2.15 K/UL — SIGNIFICANT CHANGE UP (ref 1.2–3.4)
LYMPHOCYTES # BLD AUTO: 40.6 % — SIGNIFICANT CHANGE UP (ref 20.5–51.1)
MAGNESIUM SERPL-MCNC: 1.8 MG/DL — SIGNIFICANT CHANGE UP (ref 1.8–2.4)
MCHC RBC-ENTMCNC: 29 PG — SIGNIFICANT CHANGE UP (ref 27–31)
MCHC RBC-ENTMCNC: 32.1 G/DL — SIGNIFICANT CHANGE UP (ref 32–37)
MCV RBC AUTO: 90.4 FL — SIGNIFICANT CHANGE UP (ref 80–94)
MONOCYTES # BLD AUTO: 0.32 K/UL — SIGNIFICANT CHANGE UP (ref 0.1–0.6)
MONOCYTES NFR BLD AUTO: 6 % — SIGNIFICANT CHANGE UP (ref 1.7–9.3)
NEUTROPHILS # BLD AUTO: 2.52 K/UL — SIGNIFICANT CHANGE UP (ref 1.4–6.5)
NEUTROPHILS NFR BLD AUTO: 47.7 % — SIGNIFICANT CHANGE UP (ref 42.2–75.2)
NRBC # BLD: 0 /100 WBCS — SIGNIFICANT CHANGE UP (ref 0–0)
PLATELET # BLD AUTO: 146 K/UL — SIGNIFICANT CHANGE UP (ref 130–400)
POTASSIUM SERPL-MCNC: 4.1 MMOL/L — SIGNIFICANT CHANGE UP (ref 3.5–5)
POTASSIUM SERPL-SCNC: 4.1 MMOL/L — SIGNIFICANT CHANGE UP (ref 3.5–5)
RBC # BLD: 4.07 M/UL — LOW (ref 4.7–6.1)
RBC # FLD: 11.9 % — SIGNIFICANT CHANGE UP (ref 11.5–14.5)
SODIUM SERPL-SCNC: 139 MMOL/L — SIGNIFICANT CHANGE UP (ref 135–146)
SPECIMEN SOURCE: SIGNIFICANT CHANGE UP
WBC # BLD: 5.29 K/UL — SIGNIFICANT CHANGE UP (ref 4.8–10.8)
WBC # FLD AUTO: 5.29 K/UL — SIGNIFICANT CHANGE UP (ref 4.8–10.8)

## 2019-11-30 PROCEDURE — 99233 SBSQ HOSP IP/OBS HIGH 50: CPT

## 2019-11-30 RX ADMIN — APIXABAN 5 MILLIGRAM(S): 2.5 TABLET, FILM COATED ORAL at 17:40

## 2019-11-30 RX ADMIN — Medication 100 MILLIGRAM(S): at 17:40

## 2019-11-30 RX ADMIN — Medication 100 MILLIGRAM(S): at 11:27

## 2019-11-30 RX ADMIN — CYCLOSPORINE 75 MILLIGRAM(S): 100 CAPSULE ORAL at 21:34

## 2019-11-30 RX ADMIN — Medication 5 MILLIGRAM(S): at 05:09

## 2019-11-30 RX ADMIN — CEFTRIAXONE 100 MILLIGRAM(S): 500 INJECTION, POWDER, FOR SOLUTION INTRAMUSCULAR; INTRAVENOUS at 11:27

## 2019-11-30 RX ADMIN — Medication 100 MILLIGRAM(S): at 05:09

## 2019-11-30 RX ADMIN — TAMSULOSIN HYDROCHLORIDE 0.4 MILLIGRAM(S): 0.4 CAPSULE ORAL at 21:34

## 2019-11-30 RX ADMIN — PANTOPRAZOLE SODIUM 40 MILLIGRAM(S): 20 TABLET, DELAYED RELEASE ORAL at 05:10

## 2019-11-30 RX ADMIN — AMLODIPINE BESYLATE 5 MILLIGRAM(S): 2.5 TABLET ORAL at 05:09

## 2019-11-30 RX ADMIN — MYCOPHENOLATE MOFETIL 500 MILLIGRAM(S): 250 CAPSULE ORAL at 09:01

## 2019-11-30 RX ADMIN — MYCOPHENOLATE MOFETIL 500 MILLIGRAM(S): 250 CAPSULE ORAL at 21:34

## 2019-11-30 RX ADMIN — APIXABAN 5 MILLIGRAM(S): 2.5 TABLET, FILM COATED ORAL at 05:09

## 2019-11-30 RX ADMIN — CYCLOSPORINE 100 MILLIGRAM(S): 100 CAPSULE ORAL at 09:01

## 2019-11-30 NOTE — PROGRESS NOTE ADULT - ASSESSMENT
54 year old male with PMHx of HTN, ESRD s/p Kidney transplant, DVT on eliquis, BPH who presented with three day history of fevers    #sepsis 2/2 UTI  E. Coli pansensitive per outside hospital  bcx and urine culture no growth to date   -per ID change to Rocephin 2g q24h (change to vantin 200mg q12h for 14 days if imaging negative for obstructive uropathy)      #CKD stage 3A s/p renal transplant  Creatinine, Serum: 1.6 mg/dL (11.30.19 @ 06:16)  trending up  -nephro on consult- rec to repeat US if cr uptrending tomorrow   -cont cyclosporine, prednisone, and mycophenolate   -f/u cyclosporine levels  -trend BMP daily  -avoid nephrotoxic agents when possible  -renally dose all medications     #BPH  -cont tamsulosin     #HTN  well controlled  -cont norvasc     #h/o DVT   -cont eliquis     Progress Note Handoff  Pending:  BMP tomorrow and if Cr trending up per nephro repeat US   Patient/Family discussion:  all questions and concerns addressed  Disposition: home sunday with niece

## 2019-11-30 NOTE — PROGRESS NOTE ADULT - SUBJECTIVE AND OBJECTIVE BOX
MAHADLORENE  54y, Male  Allergy: No Known Allergies    Hospital Day: 5d      Patient was seen and examined at bedside. states " i feel great" and states he is ready for discharge tomorrow no acute active complaints.         VITALS:  T(F): 97.9 (11-30-19 @ 14:20), Max: 97.9 (11-30-19 @ 14:20)  HR: 68 (11-30-19 @ 14:20)  BP: 130/78 (11-30-19 @ 14:20) (115/69 - 130/78)  RR: 20 (11-30-19 @ 14:20)  SpO2: 97% (11-30-19 @ 07:53)    PHYSICAL EXAM:  GENERAL: well developed well nourished in no acute distress  NECK: No evidence of swelling no palpable lymphadenopathy  CHEST/LUNG: clear to ausculation bilaterally no wheezes, rales, or rhonchi   HEART/VASC: RRR, No murmurs, rubs, or gallops appreciated, 2+ equal bilateral radial pulse  ABDOMEN: Soft, non tender non distended +bowel sounds in all quadrants, no palpable organomegaly   EXTREMITIES:  No clubbing and range of motion intact     TESTS & MEASUREMENTS:  Weight (Kg):   BMI (kg/m2): 34.8 (11-27)                          11.8   5.29  )-----------( 146      ( 30 Nov 2019 06:16 )             36.8       11-30    139  |  101  |  32<H>  ----------------------------<  114<H>  4.1   |  24  |  1.6<H>    Ca    9.1      30 Nov 2019 06:16  Mg     1.8     11-30              Culture - Blood (collected 11-28-19 @ 05:58)  Source: .Blood None  Preliminary Report (11-29-19 @ 11:01):    No growth to date.    Culture - Urine (collected 11-26-19 @ 08:54)  Source: .Urine Clean Catch (Midstream)  Final Report (11-27-19 @ 16:17):    No growth    Culture - Urine (collected 11-25-19 @ 18:00)  Source: .Urine Clean Catch (Midstream)  Final Report (11-27-19 @ 01:50):    No growth    Culture - Urine (collected 11-25-19 @ 12:41)  Source: .Urine Clean Catch (Midstream)  Final Report (11-27-19 @ 00:14):    No growth    Culture - Blood (collected 11-25-19 @ 12:41)  Source: .Blood Blood  Preliminary Report (11-26-19 @ 23:01):     MEDICATIONS:  MEDICATIONS  (STANDING):  allopurinol 100 milliGRAM(s) Oral daily  amLODIPine   Tablet 5 milliGRAM(s) Oral daily  apixaban 5 milliGRAM(s) Oral every 12 hours  cefTRIAXone   IVPB 2000 milliGRAM(s) IV Intermittent every 24 hours  chlorhexidine 4% Liquid 1 Application(s) Topical <User Schedule>  cycloSPORINE  (SandIMMUNE) 100 milliGRAM(s) Oral daily  cycloSPORINE  (SandIMMUNE) 75 milliGRAM(s) Oral at bedtime  influenza   Vaccine 0.5 milliLiter(s) IntraMuscular once  metoprolol tartrate 100 milliGRAM(s) Oral two times a day  mycophenolate mofetil 500 milliGRAM(s) Oral two times a day  pantoprazole    Tablet 40 milliGRAM(s) Oral before breakfast  predniSONE   Tablet 5 milliGRAM(s) Oral daily  tamsulosin 0.4 milliGRAM(s) Oral at bedtime

## 2019-11-30 NOTE — PROGRESS NOTE ADULT - ASSESSMENT
1. UTI  2. Bacteremia/septicemia, E coli as outpatient  3. s/p  Kidney transplant, creatinine higher   4. 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 HTN    Plan:    encourage oral hydration, f/u new Cr tomorrow, if Cr continues to go up, may need renal sono and IVF  Continue meropenem IV,   ID f/u  continue cyclosporine and  mycophenolate  500 g bid   Magnesium supplementation  Daily BMP

## 2019-11-30 NOTE — PROGRESS NOTE ADULT - SUBJECTIVE AND OBJECTIVE BOX
[facility] FOLLOW UP NOTE  --------------------------------------------------------------------------------  Chief Complaint/24 hour events/subjective:    no c/o, good UO    PAST HISTORY  --------------------------------------------------------------------------------  No significant changes to PMH, PSH, FHx, SHx, unless otherwise noted    ALLERGIES & MEDICATIONS  --------------------------------------------------------------------------------  Allergies    No Known Allergies    Intolerances      Standing Inpatient Medications  allopurinol 100 milliGRAM(s) Oral daily  amLODIPine   Tablet 5 milliGRAM(s) Oral daily  apixaban 5 milliGRAM(s) Oral every 12 hours  cefTRIAXone   IVPB 2000 milliGRAM(s) IV Intermittent every 24 hours  chlorhexidine 4% Liquid 1 Application(s) Topical <User Schedule>  cycloSPORINE  (SandIMMUNE) 100 milliGRAM(s) Oral daily  cycloSPORINE  (SandIMMUNE) 75 milliGRAM(s) Oral at bedtime  influenza   Vaccine 0.5 milliLiter(s) IntraMuscular once  metoprolol tartrate 100 milliGRAM(s) Oral two times a day  mycophenolate mofetil 500 milliGRAM(s) Oral two times a day  pantoprazole    Tablet 40 milliGRAM(s) Oral before breakfast  predniSONE   Tablet 5 milliGRAM(s) Oral daily  tamsulosin 0.4 milliGRAM(s) Oral at bedtime    PRN Inpatient Medications      REVIEW OF SYSTEMS  --------------------------------------------------------------------------------    All other systems were reviewed and are negative, except as noted.    VITALS/PHYSICAL EXAM  --------------------------------------------------------------------------------  T(C): 36.3 (11-30-19 @ 05:36), Max: 36.8 (11-29-19 @ 14:19)  HR: 62 (11-30-19 @ 05:36) (62 - 71)  BP: 117/69 (11-30-19 @ 05:36) (115/69 - 120/80)  RR: 18 (11-30-19 @ 05:36) (18 - 18)  SpO2: 97% (11-30-19 @ 07:53) (97% - 97%)  Wt(kg): --        Physical Exam:    	Gen: no distress   	Pulm: CTA B/L  	CV: S1S2; no rub  	Abd: +BS, soft, nontender/nondistended  	LE:  no  edema      LABS/STUDIES  --------------------------------------------------------------------------------              11.8   5.29  >-----------<  146      [11-30-19 @ 06:16]              36.8     139  |  101  |  32  ----------------------------<  114      [11-30-19 @ 06:16]  4.1   |  24  |  1.6        Ca     9.1     [11-30-19 @ 06:16]      Mg     1.8     [11-30-19 @ 06:16]            Creatinine Trend:  SCr 1.6 [11-30 @ 06:16]  SCr 1.3 [11-29 @ 04:30]  SCr 1.3 [11-28 @ 05:58]  SCr 1.4 [11-27 @ 07:47]  SCr 1.6 [11-25 @ 14:40]    Urinalysis - [11-26-19 @ 08:54]      Color Yellow / Appearance Clear / SG 1.013 / pH 6.5      Gluc Negative / Ketone Negative  / Bili Negative / Urobili 3 mg/dL       Blood Negative / Protein Trace / Leuk Est Negative / Nitrite Negative      RBC  / WBC  / Hyaline  / Gran  / Sq Epi  / Non Sq Epi  / Bacteria

## 2019-12-01 ENCOUNTER — TRANSCRIPTION ENCOUNTER (OUTPATIENT)
Age: 54
End: 2019-12-01

## 2019-12-01 VITALS
TEMPERATURE: 97 F | HEART RATE: 74 BPM | SYSTOLIC BLOOD PRESSURE: 133 MMHG | DIASTOLIC BLOOD PRESSURE: 78 MMHG | RESPIRATION RATE: 18 BRPM

## 2019-12-01 LAB
ANION GAP SERPL CALC-SCNC: 18 MMOL/L — HIGH (ref 7–14)
BUN SERPL-MCNC: 32 MG/DL — HIGH (ref 10–20)
CALCIUM SERPL-MCNC: 10.1 MG/DL — SIGNIFICANT CHANGE UP (ref 8.5–10.1)
CHLORIDE SERPL-SCNC: 99 MMOL/L — SIGNIFICANT CHANGE UP (ref 98–110)
CO2 SERPL-SCNC: 23 MMOL/L — SIGNIFICANT CHANGE UP (ref 17–32)
CREAT SERPL-MCNC: 1.4 MG/DL — SIGNIFICANT CHANGE UP (ref 0.7–1.5)
CYCLOSPORINE SER-MCNC: 145 NG/ML — LOW (ref 150–400)
GLUCOSE SERPL-MCNC: 113 MG/DL — HIGH (ref 70–99)
POTASSIUM SERPL-MCNC: 4.5 MMOL/L — SIGNIFICANT CHANGE UP (ref 3.5–5)
POTASSIUM SERPL-SCNC: 4.5 MMOL/L — SIGNIFICANT CHANGE UP (ref 3.5–5)
SODIUM SERPL-SCNC: 140 MMOL/L — SIGNIFICANT CHANGE UP (ref 135–146)

## 2019-12-01 PROCEDURE — 99239 HOSP IP/OBS DSCHRG MGMT >30: CPT

## 2019-12-01 RX ORDER — CEFPODOXIME PROXETIL 100 MG
1 TABLET ORAL
Qty: 26 | Refills: 0
Start: 2019-12-01 | End: 2019-12-13

## 2019-12-01 RX ADMIN — Medication 5 MILLIGRAM(S): at 05:23

## 2019-12-01 RX ADMIN — Medication 100 MILLIGRAM(S): at 11:28

## 2019-12-01 RX ADMIN — PANTOPRAZOLE SODIUM 40 MILLIGRAM(S): 20 TABLET, DELAYED RELEASE ORAL at 06:16

## 2019-12-01 RX ADMIN — CEFTRIAXONE 100 MILLIGRAM(S): 500 INJECTION, POWDER, FOR SOLUTION INTRAMUSCULAR; INTRAVENOUS at 11:28

## 2019-12-01 RX ADMIN — APIXABAN 5 MILLIGRAM(S): 2.5 TABLET, FILM COATED ORAL at 05:24

## 2019-12-01 RX ADMIN — CHLORHEXIDINE GLUCONATE 1 APPLICATION(S): 213 SOLUTION TOPICAL at 05:23

## 2019-12-01 RX ADMIN — MYCOPHENOLATE MOFETIL 500 MILLIGRAM(S): 250 CAPSULE ORAL at 09:02

## 2019-12-01 RX ADMIN — AMLODIPINE BESYLATE 5 MILLIGRAM(S): 2.5 TABLET ORAL at 05:24

## 2019-12-01 RX ADMIN — CYCLOSPORINE 100 MILLIGRAM(S): 100 CAPSULE ORAL at 09:03

## 2019-12-01 NOTE — DISCHARGE NOTE NURSING/CASE MANAGEMENT/SOCIAL WORK - PATIENT PORTAL LINK FT
You can access the FollowMyHealth Patient Portal offered by [facility] by registering at the following website: [email or web address]. By joining [employer]’s FollowMyHealth portal, you will also be able to view your health information using other applications (apps) compatible with our system.

## 2019-12-01 NOTE — PROGRESS NOTE ADULT - REASON FOR ADMISSION
fever and urinary symptoms

## 2019-12-01 NOTE — DISCHARGE NOTE NURSING/CASE MANAGEMENT/SOCIAL WORK - NSDCPEELIQUIS_GEN_ALL_CORE
Apixaban/Eliquis - Compliance/Apixaban/Eliquis - Dietary Advice/Apixaban/Eliquis - Potential for adverse drug reactions and interactions/Apixaban/Eliquis - Follow up monitoring

## 2019-12-01 NOTE — PROGRESS NOTE ADULT - SUBJECTIVE AND OBJECTIVE BOX
Hospital Day:  6d    Subjective:    Patient is a 54y old  Male who presents with a chief complaint of fever and urinary symptoms (30 Nov 2019 15:12)    No overnight events. Seen and examined at bedside. The patient reported no acute complaints. Seen and examined at bedside this morning. Sitting up in bed finished his breakfast. He is feeling well to going home. Will follow up on BMP for creatinine for discharge.     Past Medical Hx:   HTN (hypertension)  Kidney transplanted    Past Sx:  Transplanted kidney    Allergies:  No Known Allergies    Current Meds:   Stand Meds:  allopurinol 100 milliGRAM(s) Oral daily  amLODIPine   Tablet 5 milliGRAM(s) Oral daily  apixaban 5 milliGRAM(s) Oral every 12 hours  cefTRIAXone   IVPB 2000 milliGRAM(s) IV Intermittent every 24 hours  chlorhexidine 4% Liquid 1 Application(s) Topical <User Schedule>  cycloSPORINE  (SandIMMUNE) 100 milliGRAM(s) Oral daily  cycloSPORINE  (SandIMMUNE) 75 milliGRAM(s) Oral at bedtime  influenza   Vaccine 0.5 milliLiter(s) IntraMuscular once  metoprolol tartrate 100 milliGRAM(s) Oral two times a day  mycophenolate mofetil 500 milliGRAM(s) Oral two times a day  pantoprazole    Tablet 40 milliGRAM(s) Oral before breakfast  predniSONE   Tablet 5 milliGRAM(s) Oral daily  tamsulosin 0.4 milliGRAM(s) Oral at bedtime    PRN Meds:    HOME MEDICATIONS:  allopurinol 100 mg oral tablet: 1 tab(s) orally once a day  amLODIPine 5 mg oral tablet: 1 tab(s) orally once a day  cycloSPORINE 100 mg oral capsule: 1 cap(s) orally once a day  Eliquis 5 mg oral tablet: 1 tab(s) orally 2 times a day  metoprolol tartrate 100 mg oral tablet: 1 tab(s) orally 2 times a day  mycophenolate mofetil 500 mg oral tablet: 1 tab(s) orally 2 times a day  omeprazole 40 mg oral delayed release capsule: 1 cap(s) orally once a day  prazosin 2 mg oral capsule: 1 cap(s) orally once a day (at bedtime)  predniSONE 5 mg oral tablet: 1 tab(s) orally once a day      Vital Signs:   T(F): 97.3 (12-01-19 @ 04:52), Max: 97.9 (11-30-19 @ 14:20)  HR: 59 (12-01-19 @ 04:52) (59 - 68)  BP: 115/71 (12-01-19 @ 04:52) (115/71 - 130/78)  RR: 18 (12-01-19 @ 04:52) (18 - 20)  SpO2: 96% (11-30-19 @ 19:58) (96% - 96%)        Physical Exam:   GENERAL: NAD, Age appropriate male lying in bed pleasant and conversive.   HEENT: NCAT, PERRLA, EOMI  CHEST/LUNG: Clear to auscultation, No wheezing, rhonchi, rales   HEART: Regular rate and rhythm; s1 s2 appreciated, No murmurs, rubs, or gallops  ABDOMEN: Soft, Nontender, Nondistended; Bowel sounds present, Abdominal transplanted kidney palpated   EXTREMITIES: No LE edema b/l, Peripheral pulses 2+, No cyanosis, No clubbing, LUE with noted scar from prior AV fistula that has been removed  NERVOUS SYSTEM:  Alert & Oriented X3, Non focal    Labs:                         11.8   5.29  )-----------( 146      ( 30 Nov 2019 06:16 )             36.8       30 Nov 2019 06:16    139    |  101    |  32     ----------------------------<  114    4.1     |  24     |  1.6      Ca    9.1        30 Nov 2019 06:16  Mg     1.8       30 Nov 2019 06:16    Culture - Blood (collected 11-28-19 @ 05:58)  Source: .Blood None  Preliminary Report (11-29-19 @ 11:01):    No growth to date.    Culture - Blood (collected 11-25-19 @ 12:41)  Source: .Blood Blood  Final Report (11-30-19 @ 23:01):    No growth at 5 days.    Radiology:   None Today    Assessment and Plan:   This is a 54 year old male with PMHx of HTN, ESRD s/p Kidney transplant, DVT on eliquis, BPH who presented with three day history of fevers    #Sepsis secondary to UTI  - Noted E. Coli pansensitive per conversation with RWJ  - Cultures negative since admission to [facility]  - Continue on rocephin; vantin on discharge home   - Continues to be afebrile    #CKD stage 3A s/p Renal transplant  - Continue to monitor creatinine  - Yesterday 1.6; baseline up to 1.6. Repeat ordered for today  - Continue to avoid nephrotoxic medications   - Given renal transplant; continue on Cyclosporin, Prednisone and Cellcept  - If creatinine trending up will need repeat renal US     #H/o BPH  - Continue on Tamsulosin while inpatient     #HTN  - Continue on Amlodipine    #h/o DVT   - Continue on Eliquis     Activity: As tolerated  Diet: DASH  DVT ppx: on eliquis  GI ppx: protonix  Code Status: Full Code  DISPO: discharge home today if creatinine stable or decreased      FINAL DISCHARGE INTERVIEW:  Resident(s) Present: Jared Siegel RN Present: Kelly     DISCHARGE MEDICATION RECONCILIATION  reviewed with Attending Dr. Wilson     DISPOSITION:   [ x ] Home,    [  ] Home with Visiting Nursing Services,   [    ]  SNF/ NH,    [   ] Acute Rehab (4A),   [   ] Other (Specify:_________)

## 2019-12-01 NOTE — PROGRESS NOTE ADULT - SUBJECTIVE AND OBJECTIVE BOX
[facility] FOLLOW UP NOTE  --------------------------------------------------------------------------------  Chief Complaint/24 hour events/subjective:    no c/o    PAST HISTORY  --------------------------------------------------------------------------------  No significant changes to PMH, PSH, FHx, SHx, unless otherwise noted    ALLERGIES & MEDICATIONS  --------------------------------------------------------------------------------  Allergies    No Known Allergies    Intolerances      Standing Inpatient Medications  allopurinol 100 milliGRAM(s) Oral daily  amLODIPine   Tablet 5 milliGRAM(s) Oral daily  apixaban 5 milliGRAM(s) Oral every 12 hours  cefTRIAXone   IVPB 2000 milliGRAM(s) IV Intermittent every 24 hours  chlorhexidine 4% Liquid 1 Application(s) Topical <User Schedule>  cycloSPORINE  (SandIMMUNE) 100 milliGRAM(s) Oral daily  cycloSPORINE  (SandIMMUNE) 75 milliGRAM(s) Oral at bedtime  influenza   Vaccine 0.5 milliLiter(s) IntraMuscular once  metoprolol tartrate 100 milliGRAM(s) Oral two times a day  mycophenolate mofetil 500 milliGRAM(s) Oral two times a day  pantoprazole    Tablet 40 milliGRAM(s) Oral before breakfast  predniSONE   Tablet 5 milliGRAM(s) Oral daily  tamsulosin 0.4 milliGRAM(s) Oral at bedtime    PRN Inpatient Medications      REVIEW OF SYSTEMS  --------------------------------------------------------------------------------    All other systems were reviewed and are negative, except as noted.    VITALS/PHYSICAL EXAM  --------------------------------------------------------------------------------  T(C): 36.3 (12-01-19 @ 04:52), Max: 36.6 (11-30-19 @ 14:20)  HR: 59 (12-01-19 @ 04:52) (59 - 68)  BP: 115/71 (12-01-19 @ 04:52) (115/71 - 130/78)  RR: 18 (12-01-19 @ 04:52) (18 - 20)  SpO2: 98% (12-01-19 @ 09:04) (96% - 98%)  Wt(kg): --        Physical Exam:    	Gen: no distress   	Pulm: CTA B/L  	CV: S1S2; no rub  	Abd: +BS, soft, nontender/nondistended  	LE:  no  edema      LABS/STUDIES  --------------------------------------------------------------------------------              11.8   5.29  >-----------<  146      [11-30-19 @ 06:16]              36.8     140  |  99  |  32  ----------------------------<  113      [12-01-19 @ 07:38]  4.5   |  23  |  1.4        Ca     10.1     [12-01-19 @ 07:38]      Mg     1.8     [11-30-19 @ 06:16]            Creatinine Trend:  SCr 1.4 [12-01 @ 07:38]  SCr 1.6 [11-30 @ 06:16]  SCr 1.3 [11-29 @ 04:30]  SCr 1.3 [11-28 @ 05:58]  SCr 1.4 [11-27 @ 07:47]    Urinalysis - [11-26-19 @ 08:54]      Color Yellow / Appearance Clear / SG 1.013 / pH 6.5      Gluc Negative / Ketone Negative  / Bili Negative / Urobili 3 mg/dL       Blood Negative / Protein Trace / Leuk Est Negative / Nitrite Negative      RBC  / WBC  / Hyaline  / Gran  / Sq Epi  / Non Sq Epi  / Bacteria

## 2019-12-01 NOTE — PROGRESS NOTE ADULT - ATTENDING COMMENTS
symptomatically improved. creatinine downtrended to 1.4 today. cleared for discharge home today
Patient seen and examined independently. Agree with resident note/ history / physical exam and plan of care with following exceptions/additions/updates. Case discussed with house-staff, nursing and patient/pt decision maker.     #Progress Note Handoff  Pending (specify):  consults: ID, tests: repeat bc  Family discussion: dw pt, full code  Disposition: Home
agree,
agree with above

## 2019-12-01 NOTE — PROGRESS NOTE ADULT - ASSESSMENT
1. UTI  2. Bacteremia/septicemia, E coli as outpatient  3. s/p  Kidney transplant, Cr  back  to baseline   4. 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 HTN    Plan:    encourage oral hydration  anticipate to DC home  need outpatient Abx regimen from ID  continue cyclosporine and  mycophenolate  500 g bid   Magnesium supplementation  Daily BMP

## 2019-12-03 LAB
CULTURE RESULTS: SIGNIFICANT CHANGE UP
SPECIMEN SOURCE: SIGNIFICANT CHANGE UP

## 2019-12-05 DIAGNOSIS — N10 ACUTE PYELONEPHRITIS: ICD-10-CM

## 2019-12-05 DIAGNOSIS — Z79.01 LONG TERM (CURRENT) USE OF ANTICOAGULANTS: ICD-10-CM

## 2019-12-05 DIAGNOSIS — B96.20 UNSPECIFIED ESCHERICHIA COLI [E. COLI] AS THE CAUSE OF DISEASES CLASSIFIED ELSEWHERE: ICD-10-CM

## 2019-12-05 DIAGNOSIS — R50.9 FEVER, UNSPECIFIED: ICD-10-CM

## 2019-12-05 DIAGNOSIS — I12.9 HYPERTENSIVE CHRONIC KIDNEY DISEASE WITH STAGE 1 THROUGH STAGE 4 CHRONIC KIDNEY DISEASE, OR UNSPECIFIED CHRONIC KIDNEY DISEASE: ICD-10-CM

## 2019-12-05 DIAGNOSIS — A41.9 SEPSIS, UNSPECIFIED ORGANISM: ICD-10-CM

## 2019-12-05 DIAGNOSIS — Z86.718 PERSONAL HISTORY OF OTHER VENOUS THROMBOSIS AND EMBOLISM: ICD-10-CM

## 2019-12-05 DIAGNOSIS — N40.1 BENIGN PROSTATIC HYPERPLASIA WITH LOWER URINARY TRACT SYMPTOMS: ICD-10-CM

## 2019-12-05 DIAGNOSIS — E80.7 DISORDER OF BILIRUBIN METABOLISM, UNSPECIFIED: ICD-10-CM

## 2019-12-05 DIAGNOSIS — E83.42 HYPOMAGNESEMIA: ICD-10-CM

## 2019-12-05 DIAGNOSIS — N18.3 CHRONIC KIDNEY DISEASE, STAGE 3 (MODERATE): ICD-10-CM

## 2019-12-05 DIAGNOSIS — R39.14 FEELING OF INCOMPLETE BLADDER EMPTYING: ICD-10-CM

## 2019-12-05 DIAGNOSIS — R31.0 GROSS HEMATURIA: ICD-10-CM

## 2019-12-05 DIAGNOSIS — Z94.0 KIDNEY TRANSPLANT STATUS: ICD-10-CM

## 2020-04-13 NOTE — H&P ADULT - ATTENDING COMMENTS
Subjective:       Patient ID:  Ever Cooper is a 46 y.o. male who presents for   Chief Complaint   Patient presents with    Lesion     45 y/o M presents for urgent evaluation of bumps that he first noticed a few weeks ago.  His girlfriend was just evaluated and treated for genital warts.  Last OV 2-26-18.  He thought these were ingrown hairs.  They have been itchy and he admits to scratching at them    To his knowledge, she does not have any similar lesions      Past Medical History:   Diagnosis Date    Anemia     genetic    Ankle fracture, right     Gilbert syndrome     Hypertension     Rosacea, acne        Review of Systems   Skin: Negative for sensitivity to antibiotic ointment, sensitivity to bandage adhesive and tendency to form keloidal scars.   Hematologic/Lymphatic: Does not bruise/bleed easily.        Objective:    Physical Exam   Constitutional: He appears well-developed and well-nourished.   Neurological: He is alert and oriented to person, place, and time.   Psychiatric: He has a normal mood and affect.   Skin:   Areas Examined (abnormalities noted in diagram):   Head / Face Inspection Performed  Abdomen Inspection Performed  Genitals / Buttocks / Groin Inspection Performed  RLE Inspected  LLE Inspection Performed              Diagram Legend     Erythematous scaling macule/papule c/w actinic keratosis       Vascular papule c/w angioma      Pigmented verrucoid papule/plaque c/w seborrheic keratosis      Yellow umbilicated papule c/w sebaceous hyperplasia      Irregularly shaped tan macule c/w lentigo     1-2 mm smooth white papules consistent with Milia      Movable subcutaneous cyst with punctum c/w epidermal inclusion cyst      Subcutaneous movable cyst c/w pilar cyst      Firm pink to brown papule c/w dermatofibroma      Pedunculated fleshy papule(s) c/w skin tag(s)      Evenly pigmented macule c/w junctional nevus     Mildly variegated pigmented, slightly irregular-bordered macule c/w  mildly atypical nevus      Flesh colored to evenly pigmented papule c/w intradermal nevus       Pink pearly papule/plaque c/w basal cell carcinoma      Erythematous hyperkeratotic cursted plaque c/w SCC      Surgical scar with no sign of skin cancer recurrence      Open and closed comedones      Inflammatory papules and pustules      Verrucoid papule consistent consistent with wart     Erythematous eczematous patches and plaques     Dystrophic onycholytic nail with subungual debris c/w onychomycosis     Umbilicated papule    Erythematous-base heme-crusted tan verrucoid plaque consistent with inflamed seborrheic keratosis     Erythematous Silvery Scaling Plaque c/w Psoriasis     See annotation      Assessment / Plan:        Folliculitis  No treatment at this time  Pt would like to focus on MC    Molluscum contagiosum  Encouraged pt to discuss this Dx with his partner to ensure that she does not have MC as well  Cantharidin procedure note:  Cantharidin placed in office on 4 lesions on patient's abdomen. Discussed areas treated should be washed off in 1-2 hours or sooner should blisters develop. Blisters should resolve in 1 week. A dark or white spot may occur in areas treated. This is the skin's response to irritation and should resolve with time.     [employer] Brochure was provided.    Pinch expression procedure note:  Pinch expression after areas cleaned with alcohol x 20 lesions on patient's abdomen. Patient tolerated well.   Demonstrated pinch technique to patient.    May add Aldara at follow up    Zn supplement 220 mg, 2 in the morning, 1 in the evening         Follow up in about 1 week (around 4/20/2020).   Patient seen and examined independently. Agree with resident note/ history / physical exam and plan of care with following exceptions/additions/updates. Case discussed with house-staff, nursing and patient/pt decision maker.    ian nephro    #Progress Note Handoff  Pending (specify): Tests: tacrolimus level, repeat bc, get the records ( [facility] and [facility]) from [facility] discussion: ian pt full code  Disposition: Home

## 2020-06-13 NOTE — H&P ADULT - NSHPLABSRESULTS_GEN_ALL_CORE
Internal Medicine 12.7   6.21  )-----------( 73       ( 2019 14:40 )             39.0           136  |  98  |  21<H>  ----------------------------<  101<H>  4.5   |  22  |  1.6<H>    Ca    9.6      2019 14:40  Mg     1.4         TPro  7.0  /  Alb  3.8  /  TBili  2.1<H>  /  DBili  x   /  AST  20  /  ALT  16  /  AlkPhos  81                Urinalysis Basic - ( 2019 12:41 )    Color: Yellow / Appearance: Clear / S.016 / pH: x  Gluc: x / Ketone: Negative  / Bili: Negative / Urobili: 6 mg/dL   Blood: x / Protein: 100 mg/dL / Nitrite: Negative   Leuk Esterase: Negative / RBC: 20 /HPF / WBC 11 /HPF   Sq Epi: x / Non Sq Epi: 1 /HPF / Bacteria: Negative            Lactate Trend   @ 14:40 Lactate:1.3             CAPILLARY BLOOD GLUCOSE

## 2022-05-19 VITALS
HEIGHT: 66.93 IN | HEART RATE: 60 BPM | BODY MASS INDEX: 31.83 KG/M2 | SYSTOLIC BLOOD PRESSURE: 130 MMHG | DIASTOLIC BLOOD PRESSURE: 70 MMHG | WEIGHT: 202.83 LBS

## 2022-07-10 NOTE — ED ADULT NURSE NOTE - OBJECTIVE STATEMENT
Pt got called back from [facility] for positive blood cultures which was taken on Saturday. Pt being treated for UTI. unable to assess pt intubated/ no family

## 2022-08-11 PROBLEM — I10 ESSENTIAL (PRIMARY) HYPERTENSION: Chronic | Status: ACTIVE | Noted: 2019-11-25

## 2022-08-11 PROBLEM — Z94.0 KIDNEY TRANSPLANT STATUS: Chronic | Status: ACTIVE | Noted: 2019-11-25

## 2022-09-21 ENCOUNTER — APPOINTMENT (OUTPATIENT)
Dept: CARDIOLOGY | Facility: CLINIC | Age: 57
End: 2022-09-21

## 2022-09-21 VITALS — HEIGHT: 66 IN | WEIGHT: 201.38 LBS | BODY MASS INDEX: 32.36 KG/M2

## 2022-09-21 DIAGNOSIS — I77.810 THORACIC AORTIC ECTASIA: ICD-10-CM

## 2022-09-21 DIAGNOSIS — I10 ESSENTIAL (PRIMARY) HYPERTENSION: ICD-10-CM

## 2022-09-21 DIAGNOSIS — Z94.0 KIDNEY TRANSPLANT STATUS: ICD-10-CM

## 2022-09-21 DIAGNOSIS — I65.23 OCCLUSION AND STENOSIS OF BILATERAL CAROTID ARTERIES: ICD-10-CM

## 2022-09-21 PROCEDURE — 99205 OFFICE O/P NEW HI 60 MIN: CPT

## 2022-09-21 PROCEDURE — 99215 OFFICE O/P EST HI 40 MIN: CPT

## 2022-09-21 RX ORDER — PRAZOSIN HYDROCHLORIDE 2 MG/1
2 CAPSULE ORAL
Refills: 0 | Status: ACTIVE | COMMUNITY

## 2022-09-21 RX ORDER — CYCLOSPORINE 100 MG/1
100 CAPSULE, GELATIN COATED ORAL
Refills: 0 | Status: ACTIVE | COMMUNITY

## 2022-09-21 RX ORDER — ALLOPURINOL 100 MG/1
100 TABLET ORAL
Refills: 0 | Status: ACTIVE | COMMUNITY

## 2022-09-21 RX ORDER — MINOXIDIL 2.5 MG/1
2.5 TABLET ORAL
Refills: 0 | Status: ACTIVE | COMMUNITY

## 2022-09-21 RX ORDER — PREDNISONE 5 MG/1
5 TABLET ORAL
Refills: 0 | Status: ACTIVE | COMMUNITY

## 2022-09-21 RX ORDER — METOPROLOL TARTRATE 100 MG/1
100 TABLET, FILM COATED ORAL
Refills: 0 | Status: ACTIVE | COMMUNITY

## 2022-09-21 RX ORDER — MYCOPHENOLATE MOFETIL 500 MG/1
500 TABLET ORAL
Refills: 0 | Status: ACTIVE | COMMUNITY

## 2022-09-21 NOTE — DISCUSSION/SUMMARY
[FreeTextEntry1] : Pt on aspirin not sure why, but does not need for cv reasons \par will check bloodwork prior to next visit \par LVH \par get 2 d echo \par bloodwork \par f/u 6 months

## 2022-09-21 NOTE — HISTORY OF PRESENT ILLNESS
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 \par \par AMLODIPINE CAUSED EDEMA \par \par 9/22: pt denies any edema in legs, pt has pain in arch area and tingling in left toes intermittently, pt denies sob or cp, pt just moved and no sob but had back problems and scoliosis and bulging discs. \par pt denies cp or sob, no dizziness or lightheadeness. \par \par pt says creatinine 1.4 or 1.3 on last visit.

## 2022-09-21 NOTE — PHYSICAL EXAM
[Normal] : clear lung fields, good air entry, no respiratory distress [No Edema] : no edema [No Cyanosis] : no cyanosis

## 2022-09-28 ENCOUNTER — NON-APPOINTMENT (OUTPATIENT)
Age: 57
End: 2022-09-28

## 2022-09-28 DIAGNOSIS — R00.2 PALPITATIONS: ICD-10-CM

## 2022-09-28 DIAGNOSIS — R55 SYNCOPE AND COLLAPSE: ICD-10-CM

## 2022-11-28 NOTE — PATIENT PROFILE ADULT - PRO INTERPRETER NEED 2
"Chinyere Priest is a 66 y.o. female here for a non-provider visit for:   HEPATITIS B 2 of 3    Reason for immunization: continue or complete series started at the office  Immunization records indicate need for vaccine: Yes, confirmed with Epic  Minimum interval has been met for this vaccine: Yes  ABN completed: Yes    VIS Dated  2021 was given to patient: Yes  All IAC Questionnaire questions were answered \"No.\"    Patient tolerated injection and no adverse effects were observed or reported: Yes    Pt scheduled for next dose in series: Yes  "
English

## 2022-12-16 ENCOUNTER — NON-APPOINTMENT (OUTPATIENT)
Age: 57
End: 2022-12-16

## 2022-12-16 DIAGNOSIS — Z78.9 OTHER SPECIFIED HEALTH STATUS: ICD-10-CM

## 2023-02-22 ENCOUNTER — APPOINTMENT (OUTPATIENT)
Dept: CARDIOLOGY | Facility: CLINIC | Age: 58
End: 2023-02-22
Payer: MEDICARE

## 2023-02-22 PROCEDURE — 93306 TTE W/DOPPLER COMPLETE: CPT

## 2023-04-10 ENCOUNTER — APPOINTMENT (OUTPATIENT)
Dept: CARDIOLOGY | Facility: CLINIC | Age: 58
End: 2023-04-10

## 2023-04-10 DIAGNOSIS — I51.7 CARDIOMEGALY: ICD-10-CM

## 2023-04-10 DIAGNOSIS — I65.29 OCCLUSION AND STENOSIS OF UNSPECIFIED CAROTID ARTERY: ICD-10-CM

## 2023-04-10 DIAGNOSIS — I50.32 CHRONIC DIASTOLIC (CONGESTIVE) HEART FAILURE: ICD-10-CM

## 2023-04-10 DIAGNOSIS — N18.30 CHRONIC KIDNEY DISEASE, STAGE 3 UNSPECIFIED: ICD-10-CM

## 2023-04-10 DIAGNOSIS — R94.31 ABNORMAL ELECTROCARDIOGRAM [ECG] [EKG]: ICD-10-CM

## 2023-04-10 NOTE — HISTORY OF PRESENT ILLNESS
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  LVH LVMI NOT DONE. \par \par AMLODIPINE CAUSED EDEMA \par \par 9/22: pt denies any edema in legs, pt has pain in arch area and tingling in left toes intermittently, pt denies sob or cp, pt just moved and no sob but had back problems and scoliosis and bulging discs. \par pt denies cp or sob, no dizziness or lightheadeness. \par \par pt says creatinine 1.4 or 1.3 on last visit. \par \par 4/10/23: \par echo: lvef 60%, lvh, mild sov dilated at 3.7 cm, mild LVH

## 2023-04-10 NOTE — CARDIOLOGY SUMMARY
[de-identified] : 2/22/23: \par . The left ventricular cavity is small in size. The left ventricular systolic function is normal with an ejection fraction visually estimated at 55 to 60 %. There is mild (Grade 1) left ventricular diastolic dysfunction, with elevated filling pressure.\par 2. Normal atria.\par 3. The left atrium is mildly dilated in size.\par 4. Aortic root at sinuses of Valsalva is borderline dilated. 3.7 cm \par 5. Mild calcification of the aortic valve leaflets.\par 6. Mild left ventricular hypertrophy.\par 7. Mild thickening of the aortic valve leaflets.

## 2025-05-01 NOTE — CONSULT NOTE ADULT - PROVIDER SPECIALTY LIST ADULT
SUBJECTIVE:    Patient ID: Liv Decker is a 68 y.o. female.    Chief Complaint: COPD (Patient call yesterday, pt take cough medication, need refill, vertigo , abc )    HPI  History of Present Illness    CHIEF COMPLAINT:  Liv presents today with worsening COPD exacerbation.    COPD:  She has chronic COPD with frequent exacerbations. Currently experiencing tickle sensation triggering uncontrollable coughing episodes and palpitations which worsen with albuterol use. She has a nebulizer at home and denies requiring home oxygen.    MEDICATIONS:  She currently uses Utrelogy and has an albuterol inhaler. She takes promethazine syrup at night for cough but avoids daytime use due to sedating effects.    SOCIAL HISTORY:  She works as a caregiver, providing support for an individual.      ROS:  General: -fever, -chills, -fatigue, -weight gain, -weight loss  Eyes: -vision changes, -redness, -discharge  ENT: -ear pain, -nasal congestion, -sore throat  Cardiovascular: -chest pain, +palpitations, -lower extremity edema  Respiratory: +cough, -shortness of breath, +wheezing, +productive cough, +sputum production  Gastrointestinal: -abdominal pain, -nausea, -vomiting, -diarrhea, -constipation, -blood in stool  Genitourinary: -dysuria, -hematuria, -frequency  Musculoskeletal: -joint pain, -muscle pain  Skin: -rash, -lesion  Neurological: -headache, -dizziness, -numbness, -tingling  Psychiatric: -anxiety, -depression, -sleep difficulty         Hospital Outpatient Visit on 03/25/2025   Component Date Value Ref Range Status    BSA 03/25/2025 1.42  m2 Final    Calix's Biplane MOD Ejection Fra* 03/25/2025 63  % Final    A2C EF 03/25/2025 61  % Final    A4C EF 03/25/2025 65  % Final    LVOT stroke volume 03/25/2025 67.8  cm3 Final    LVIDd 03/25/2025 4.9  3.5 - 6.0 cm Final    LV Systolic Volume 03/25/2025 35  mL Final    LV Systolic Volume Index 03/25/2025 24.6  mL/m2 Final    LVIDs 03/25/2025 3.0  2.1 - 4.0 cm Final    LV ESV A2C 
03/25/2025 62.30  mL Final    LV Diastolic Volume 03/25/2025 113  mL Final    LV ESV A4C 03/25/2025 47.40  mL Final    LV Diastolic Volume Index 03/25/2025 79.58  mL/m2 Final    LV EDV A2C 03/25/2025 [id number]  mL Final    LV EDV A4C 03/25/2025 77.00  mL Final    Left Ventricular End Systolic Volu* 03/25/2025 35.00  mL Final    Left Ventricular End Diastolic Vol* 03/25/2025 112.81  mL Final    IVS 03/25/2025 0.8  0.6 - 1.1 cm Final    LVOT diameter 03/25/2025 2.0  cm Final    LVOT area 03/25/2025 3.1  cm2 Final    FS 03/25/2025 38.8  28 - 44 % Final    Left Ventricle Relative Wall Thick* 03/25/2025 0.33  cm Final    PW 03/25/2025 0.8  0.6 - 1.1 cm Final    LV mass 03/25/2025 131.2  g Final    LV Mass Index 03/25/2025 92.4  g/m2 Final    MV Peak E Ed 03/25/2025 1.38  m/s Final    TDI LATERAL 03/25/2025 0.07  m/s Final    TDI SEPTAL 03/25/2025 1.08  m/s Final    E/E' ratio 03/25/2025 2  m/s Final    MV Peak A Ed 03/25/2025 1.08  m/s Final    TR Max Ed 03/25/2025 2.2  m/s Final    E/A ratio 03/25/2025 1.28   Final    IVRT 03/25/2025 70  msec Final    E wave deceleration time 03/25/2025 250  msec Final    LV SEPTAL E/E' RATIO 03/25/2025 1.3  m/s Final    LV LATERAL E/E' RATIO 03/25/2025 19.7  m/s Final    LVOT peak ed 03/25/2025 0.8  m/s Final    Left Ventricular Outflow Tract Tori* 03/25/2025 0.56  cm/s Final    Left Ventricular Outflow Tract Tori* 03/25/2025 1.00  mmHg Final    RV- wall basal diam 03/25/2025 1.8  cm Final    RV S' 03/25/2025 11.90  cm/s Final    TAPSE 03/25/2025 2.57  cm Final    RV/LV Ratio 03/25/2025 0.37  cm Final    LA size 03/25/2025 3.2  cm Final    LA Vol (MOD) 03/25/2025 58  mL Final    HOWARD (MOD) 03/25/2025 41  mL/m2 Final    AV regurgitation pressure 1/2 time 03/25/2025 510  ms Final    AR Max Ed 03/25/2025 4.00  m/s Final    AV mean gradient 03/25/2025 29  mmHg Final    AV peak gradient 03/25/2025 44  mmHg Final    Ao peak ed 03/25/2025 3.3  m/s Final    Ao VTI 03/25/2025 92.1  
cm Final    LVOT peak VTI 03/25/2025 21.6  cm Final    AV valve area 03/25/2025 0.7  cm² Final    AV Velocity Ratio 03/25/2025 0.24   Final    AV index (prosthetic) 03/25/2025 0.23   Final    BLAKE by Velocity Ratio 03/25/2025 0.8  cm² Final    Mr max ashley 03/25/2025 5.93  m/s Final    MV mean gradient 03/25/2025 3  mmHg Final    MV peak gradient 03/25/2025 8  mmHg Final    MV valve area by continuity eq 03/25/2025 1.45  cm2 Final    MV VTI 03/25/2025 46.7  cm Final    Triscuspid Valve Regurgitation Pea* 03/25/2025 20  mmHg Final    PV PEAK VELOCITY 03/25/2025 0.81  m/s Final    PV peak gradient 03/25/2025 3  mmHg Final    Pulmonary Valve Mean Velocity 03/25/2025 0.54  m/s Final    Ao root annulus 03/25/2025 2.30  cm Final    IVC diameter 03/25/2025 1.40  cm Final    Mean e' 03/25/2025 0.58  m/s Final    ZLVIDS 03/25/2025 0.81   Final    ZLVIDD 03/25/2025 1.17   Final    LA area A4C 03/25/2025 19.00  cm2 Final    LA area A2C 03/25/2025 19.90  cm2 Final    RVDD 03/25/2025 1.80  cm Final    AORTIC VALVE CUSP SEPERATION 03/25/2025 0.70  cm Final   Lab Visit on 03/25/2025   Component Date Value Ref Range Status    BNP 03/25/2025 213 (H)  <=99 pg/mL Final    Sodium 03/25/2025 139  136 - 145 mmol/L Final    Potassium 03/25/2025 4.0  3.5 - 5.1 mmol/L Final    Chloride 03/25/2025 106  95 - 110 mmol/L Final    CO2 03/25/2025 27  23 - 29 mmol/L Final    Glucose 03/25/2025 80  70 - 110 mg/dL Final    BUN 03/25/2025 11  8 - 23 mg/dL Final    Creatinine 03/25/2025 0.9  0.5 - 1.4 mg/dL Final    Calcium 03/25/2025 8.9  8.7 - 10.5 mg/dL Final    Anion Gap 03/25/2025 6 (L)  8 - 16 mmol/L Final    eGFR 03/25/2025 >60  >60 mL/min/1.73/m2 Final   Office Visit on 03/10/2025   Component Date Value Ref Range Status    QRS Duration 03/10/2025 82  ms Final    [facility] QTC Calculation 03/10/2025 427  ms Final   Patient Outreach on 02/11/2025   Component Date Value Ref Range Status    Left Eye DM Retinopathy 03/17/2023 Negative   Final    Right Eye 
DM Retinopathy 03/17/2023 Negative   Final   Office Visit on 01/27/2025   Component Date Value Ref Range Status    Hemoglobin A1C, POC 01/27/2025 5.5  % Final   Telephone on 01/08/2025   Component Date Value Ref Range Status    WBC 01/28/2025 5.0  3.8 - 10.8 Thousand/uL Final    RBC 01/28/2025 4.22  3.80 - 5.10 Million/uL Final    Hemoglobin 01/28/2025 12.5  11.7 - 15.5 g/dL Final    Hematocrit 01/28/2025 38.0  35.0 - 45.0 % Final    MCV 01/28/2025 90.0  80.0 - 100.0 fL Final    MCH 01/28/2025 29.6  27.0 - 33.0 pg Final    MCHC 01/28/2025 32.9  32.0 - 36.0 g/dL Final    RDW 01/28/2025 14.4  11.0 - 15.0 % Final    Platelets 01/28/2025 132 (L)  140 - 400 Thousand/uL Final    MPV 01/28/2025 12.9 (H)  7.5 - 12.5 fL Final    Neutrophils, Abs 01/28/2025 3,180  1,500 - 7,800 cells/uL Final    Lymph # 01/28/2025 1,210  850 - 3,900 cells/uL Final    Mono # 01/28/2025 480  200 - 950 cells/uL Final    Eos # 01/28/2025 90  15 - 500 cells/uL Final    Baso # 01/28/2025 40  0 - 200 cells/uL Final    Neutrophils Relative 01/28/2025 63.6  % Final    Lymph % 01/28/2025 24.2  % Final    Mono % 01/28/2025 9.6  % Final    Eosinophil % 01/28/2025 1.8  % Final    Basophil % 01/28/2025 0.8  % Final    Glucose 01/28/2025 100 (H)  65 - 99 mg/dL Final    BUN 01/28/2025 15  7 - 25 mg/dL Final    Creatinine 01/28/2025 0.81  0.60 - 1.00 mg/dL Final    eGFR 01/28/2025 78  > OR = 60 mL/min/1.73m2 Final    BUN/Creatinine Ratio 01/28/2025 SEE NOTE:  6 - 22 (calc) Final    Sodium 01/28/2025 142  135 - 146 mmol/L Final    Potassium 01/28/2025 3.8  3.5 - 5.3 mmol/L Final    Chloride 01/28/2025 102  98 - 110 mmol/L Final    CO2 01/28/2025 31  20 - 32 mmol/L Final    Calcium 01/28/2025 9.0  8.6 - 10.4 mg/dL Final    Total Protein 01/28/2025 6.2  6.1 - 8.1 g/dL Final    Albumin 01/28/2025 3.7  3.6 - 5.1 g/dL Final    Globulin, Total 01/28/2025 2.5  1.9 - 3.7 g/dL (calc) Final    Albumin/Globulin Ratio 01/28/2025 1.5  1.0 - 2.5 (calc) Final    Total 
Bilirubin 01/28/2025 0.3  0.2 - 1.2 mg/dL Final    Alkaline Phosphatase 01/28/2025 57  37 - 153 U/L Final    AST 01/28/2025 9 (L)  10 - 35 U/L Final    ALT 01/28/2025 9  6 - 29 U/L Final    Hemoglobin A1C 01/28/2025 5.4  <5.7 % of total Hgb Final    Cholesterol 01/28/2025 127  <200 mg/dL Final    HDL 01/28/2025 39 (L)  > OR = 50 mg/dL Final    Triglycerides 01/28/2025 143  <150 mg/dL Final    LDL Cholesterol 01/28/2025 65  mg/dL (calc) Final    HDL/Cholesterol Ratio 01/28/2025 3.3  <5.0 (calc) Final    Non HDL Chol. (LDL+VLDL) 01/28/2025 88  <130 mg/dL (calc) Final    Creatinine, Urine 01/28/2025 40  20 - 275 mg/dL Final    Microalb, Ur 01/28/2025 0.8  See Note: mg/dL Final    Microalb/Creat Ratio 01/28/2025 20  <30 mg/g creat Final    TSH w/reflex to FT4 01/28/2025 3.98  0.40 - 4.50 mIU/L Final    Color, UA 01/28/2025 YELLOW  YELLOW Final    Appearance, UA 01/28/2025 CLEAR  CLEAR Final    Specific Gravity, UA 01/28/2025 1.016  1.001 - 1.035 Final    pH, UA 01/28/2025 < OR = 5.0  5.0 - 8.0 Final    Glucose, UA 01/28/2025 3+ (A)  NEGATIVE Final    Bilirubin, UA 01/28/2025 NEGATIVE  NEGATIVE Final    Ketones, UA 01/28/2025 NEGATIVE  NEGATIVE Final    Occult Blood UA 01/28/2025 NEGATIVE  NEGATIVE Final    Protein, UA 01/28/2025 NEGATIVE  NEGATIVE Final    Nitrite, UA 01/28/2025 NEGATIVE  NEGATIVE Final    Leukocytes, UA 01/28/2025 NEGATIVE  NEGATIVE Final    WBC Casts, UA 01/28/2025 NONE SEEN  < OR = 5 /HPF Final    RBC Casts, UA 01/28/2025 NONE SEEN  < OR = 2 /HPF Final    Squam Epithel, UA 01/28/2025 NONE SEEN  < OR = 5 /HPF Final    Bacteria, UA 01/28/2025 NONE SEEN  NONE SEEN /HPF Final    Hyaline Casts, UA 01/28/2025 NONE SEEN  NONE SEEN /LPF Final    Service Cmt: 01/28/2025 SEE COMMENT   Final    Reflexive Urine Culture 01/28/2025 SEE COMMENT   Final       Past Medical History:   Diagnosis Date    Anemia     Depression     Diabetes mellitus 2021    Encounter for blood transfusion     GERD (gastroesophageal reflux 
Urology
"disease)     Hyperlipidemia     Hypertension     MI (myocardial infarction) 2007    Osteoporosis     Thyroid disease     Ulcer of the stomach and intestine     pos. for h. pylori    Weight loss 2022    food comes up      Social History[1]  Past Surgical History:   Procedure Laterality Date    APPENDECTOMY      CHOLECYSTECTOMY      COLONOSCOPY N/A 03/18/2016    Procedure: COLONOSCOPY;  Surgeon: Rober Zelaya Jr., MD;  Location: [facility] ENDO;  Service: Endoscopy;  Laterality: N/A;    ESOPHAGOGASTRODUODENOSCOPY  04/15/2016    with dil    ESOPHAGOGASTRODUODENOSCOPY N/A 05/20/2022    Procedure: EGD (ESOPHAGOGASTRODUODENOSCOPY);  Surgeon: Justine Villegas MD;  Location: [facility] ENDO;  Service: Endoscopy;  Laterality: N/A;    EYE SURGERY      as a child    TONSILLECTOMY      TUBAL LIGATION       Family History   Problem Relation Name Age of Onset    Heart disease Mother Bonna     Diabetes Father         The CVD Risk score (Junior et al., 2008) failed to calculate for the following reasons:    The patient has a prior MI, stroke, CHF, or peripheral vascular disease diagnosis    Tests to Keep You Healthy    Tobacco Cessation: NO      Review of patient's allergies indicates:  No Known Allergies  Current Medications[2]    Review of Systems        Objective:      Vitals:    04/22/25 1058   BP: 124/60   Pulse: 85   Temp: 97.6 °F (36.4 °C)   SpO2: 97%   Weight: 46.3 kg (102 lb)   Height: 5' 1" (1.549 m)     Physical Exam  Physical Exam    Constitutional: In no acute distress. Normal appearance. Well-developed. Not ill-appearing.  HENT: Normocephalic. Atraumatic. External ears normal bilaterally. Nose normal. Normal lips. Oropharynx clear.  Eyes: No scleral icterus. Pupils are equal, round, and reactive to light.  Neck: No thyromegaly. No carotid bruit.  Cardiovascular: Normal rate and regular rhythm. Radial pulses are 2+ bilaterally. Normal heart sounds. No murmur heard.  Pulmonary: Pulmonary effort is normal. Normal breath "
Nephrology
sounds. Wheezing. Lungs are inflamed with mucus.  Abdomen: Bowel sounds are normal. Abdomen is soft. No abdominal tenderness.  Musculoskeletal: Normal range of motion at all joints. Normal range of motion of the cervical back. No lumbar spasms. No lower extremity edema bilaterally.  Skin: Warm. Dry. Capillary refill takes less than 2 seconds.  Neurological: No focal deficit present. Alert and oriented to person, place, and time. No cranial nerve deficit. Sensation intact. No motor weakness. Gait is intact.  Psychiatric: Attention normal. Mood normal. Speech normal. Behavior is cooperative. No homicidal ideation. No suicidal ideation.  Vitals: SpO2: 97%.           Assessment:       1. Chronic obstructive pulmonary disease with acute exacerbation         Plan:       Chronic obstructive pulmonary disease with acute exacerbation  -     dexAMETHasone injection 8 mg  -     amoxicillin-clavulanate 875-125mg (AUGMENTIN) 875-125 mg per tablet; Take 1 tablet by mouth 2 (two) times daily. for 10 days  Dispense: 20 tablet; Refill: 0  -     promethazine-dextromethorphan (PROMETHAZINE-DM) 6.25-15 mg/5 mL Syrp; Take 10 mLs by mouth every 6 (six) hours as needed (take 5 or 10 ml every 6 hours as needed to supress cough).  Dispense: 400 mL; Refill: 0  -     guaiFENesin (MUCINEX) 600 mg 12 hr tablet; Take 1 tablet (600 mg total) by mouth 2 (two) times daily. for 7 days  Dispense: 14 tablet; Refill: 0      Assessment & Plan    J44.1 Chronic obstructive pulmonary disease with (acute) exacerbation  R06.2 Wheezing  R09.89 Other specified symptoms and signs involving the circulatory and respiratory systems  R00.2 Palpitations    IMPRESSION:  - Presenting with exacerbation of chronic lung disease, likely COPD.  - Wheezing noted on exam, indicating airway inflammation.  - SpO2 at 97%, not requiring supplemental oxygen.  - Suspect bacterial infection complicating underlying lung condition.  - Initiated antibiotics and steroids to manage 
acute exacerbation and reduce inflammation.    CHRONIC OBSTRUCTIVE PULMONARY DISEASE (COPD) EXACERBATION:  - Evaluated the patient's chronic lung disease with worsening symptoms.  - Noted SpO2 at 97%.  - Auscultated wheezing and observed inflamed lungs with mucus.  - Determined no improvement since last visit.  - Suspected a bacterial infection superimposed on lung disease.  - Prescribed Augmentin for 10 days to address the suspected bacterial infection.  - Administered intramuscular steroid injection in office and prescribed oral steroids.  - Recommend Mucinex to help with mucus expectoration.  - Instructed the patient to use albuterol nebulizer 2-3 times daily for the next few days.  - Continued Utrelogy without changes.  - Maintained promethazine syrup for nighttime use without alterations.  - Educated the patient on the importance of regular nebulizer use to mobilize mucus and improve airway patency.  - Documented patient's report of chronic cough and dyspnea.  - Observed mucus in lungs during exam.  - Prescribed promethazine syrup for cough management, particularly for nocturnal symptoms.  - Recommend Mucinex to facilitate mucus clearance.  - Liv to rest and avoid overexertion while on steroids.  - Liv to stay home from work for at least 2 days to recover.    WHEEZING:  - Noted patient's report of chronic wheezing exacerbated by coughing.  - Confirmed wheezing on physical exam.  - Recommend using albuterol nebulizer to improve bronchodilation.  - Instructed the patient to use albuterol nebulizer 2-3 times daily for the next few days.  - Educated the patient on the importance of regular nebulizer use to mobilize mucus and improve airway patency.    PALPITATIONS:  - Noted patient's report of forceful heart pounding.  - Acknowledged palpitations as a potential side effect of albuterol therapy.    OTHER SYMPTOMS AND SIDE EFFECTS:  - Informed about potential side effects of steroid injection, including feeling 
restless, increased energy, flushed cheeks, and feeling warm.    FOLLOW-UP:  - Scheduled follow up in 2 days.         No follow-ups on file.        This note was generated with the assistance of ambient listening technology. Verbal consent was obtained by the patient and accompanying visitor(s) for the recording of patient appointment to facilitate this note. I attest to having reviewed and edited the generated note for accuracy, though some syntax or spelling errors may persist. Please contact the author of this note for any clarification.      2025 Kristel Ocasio         [1]   Social History  Socioeconomic History    Marital status:     Number of children: 4   Occupational History    Occupation: CNA   Tobacco Use    Smoking status: Some Days     Current packs/day: 0.00     Average packs/day: 0.5 packs/day for 63.7 years (31.8 ttl pk-yrs)     Types: Cigarettes     Start date: 1959     Last attempt to quit: 2022     Years since quittin.6     Passive exposure: Past    Smokeless tobacco: Never    Tobacco comments:     current 4ppd, prior use was 1ppd for 45 years   Substance and Sexual Activity    Alcohol use: No    Drug use: No    Sexual activity: Never     Social Drivers of Health     Financial Resource Strain: Medium Risk (3/31/2025)    Overall Financial Resource Strain (CARDIA)     Difficulty of Paying Living Expenses: Somewhat hard   Food Insecurity: Food Insecurity Present (3/31/2025)    Hunger Vital Sign     Worried About Running Out of Food in the Last Year: Sometimes true     Ran Out of Food in the Last Year: Sometimes true   Transportation Needs: No Transportation Needs (3/31/2025)    PRAPARE - Transportation     Lack of Transportation (Medical): No     Lack of Transportation (Non-Medical): No   Physical Activity: Sufficiently Active (3/31/2025)    Exercise Vital Sign     Days of Exercise per Week: 5 days     Minutes of Exercise per Session: 30 min   Stress: Stress Concern Present 
(11/13/2023)    [employer] - Occupational Stress Questionnaire     Feeling of Stress : To some extent   Housing Stability: High Risk (3/31/2025)    Housing Stability Vital Sign     Unable to Pay for Housing in the Last Year: No     Number of Times Moved in the Last Year: 12     Homeless in the Last Year: No   [2]   Current Outpatient Medications:     albuterol (PROAIR HFA) 90 mcg/actuation inhaler, Inhale 2 puffs into the lungs every 6 (six) hours as needed for Wheezing or Shortness of Breath. Rescue, Disp: 18 g, Rfl: 5    albuterol (PROVENTIL) 2.5 mg /3 mL (0.083 %) nebulizer solution, Take 3 mLs (2.5 mg total) by nebulization every 6 (six) hours as needed for Wheezing or Shortness of Breath. Rescue, Disp: 120 mL, Rfl: 3    atorvastatin (LIPITOR) 10 MG tablet, TAKE 1 TABLET(10 MG) BY MOUTH EVERY DAY, Disp: 90 tablet, Rfl: 3    blood sugar diagnostic Strp, 1 each by In Vitro route once daily., Disp: 100 each, Rfl: 3    buPROPion (WELLBUTRIN XL) 150 MG TB24 tablet, Take 1 tablet (150 mg total) by mouth once daily., Disp: 90 tablet, Rfl: 3    calcium carbonate (OS-JOSELYN) 600 mg calcium (1,500 mg) Tab, Take 600 mg by mouth once daily., Disp: , Rfl:     empagliflozin (JARDIANCE) 10 mg tablet, Take 1 tablet (10 mg total) by mouth once daily., Disp: 30 tablet, Rfl: 11    EScitalopram oxalate (LEXAPRO) 20 MG tablet, Take 1 tablet (20 mg total) by mouth once daily., Disp: 90 tablet, Rfl: 3    fluticasone propionate (FLONASE) 50 mcg/actuation nasal spray, 1 spray (50 mcg total) by Each Nostril route once daily., Disp: 16 g, Rfl: 2    fluticasone-umeclidin-vilanter (TRELEGY ELLIPTA) 100-62.5-25 mcg DsDv, Inhale 1 puff into the lungs once daily., Disp: 60 each, Rfl: 11    furosemide (LASIX) 20 MG tablet, Take 1 tablet (20 mg total) by mouth once daily., Disp: 45 tablet, Rfl: 1    gabapentin (NEURONTIN) 300 MG capsule, Take 1 capsule (300 mg total) by mouth 2 (two) times daily., Disp: 180 capsule, 
Rfl: 3    levothyroxine (SYNTHROID) 50 MCG tablet, Take 1 tablet (50 mcg total) by mouth before breakfast., Disp: 90 tablet, Rfl: 1    lisinopriL (PRINIVIL,ZESTRIL) 2.5 MG tablet, Take 1 tablet (2.5 mg total) by mouth once daily., Disp: 90 tablet, Rfl: 3    pantoprazole (PROTONIX) 40 MG tablet, Take 1 tablet (40 mg total) by mouth 2 (two) times daily., Disp: 180 tablet, Rfl: 3    propranoloL (INDERAL LA) 80 MG 24 hr capsule, Take 1 capsule (80 mg total) by mouth once daily., Disp: 90 capsule, Rfl: 3    amoxicillin-clavulanate 875-125mg (AUGMENTIN) 875-125 mg per tablet, Take 1 tablet by mouth 2 (two) times daily. for 10 days, Disp: 20 tablet, Rfl: 0    ergocalciferol (ERGOCALCIFEROL) 50,000 unit Cap, Take 1 capsule (50,000 Units total) by mouth every 7 days. (Patient not taking: Reported on 4/22/2025), Disp: 12 capsule, Rfl: 3    promethazine-dextromethorphan (PROMETHAZINE-DM) 6.25-15 mg/5 mL Syrp, Take 10 mLs by mouth every 6 (six) hours as needed (take 5 or 10 ml every 6 hours as needed to supress cough)., Disp: 400 mL, Rfl: 0    
Infectious Disease

## 2025-07-03 NOTE — ED ADULT NURSE NOTE - NS ED NURSE LEVEL OF CONSCIOUSNESS MENTAL STATUS
Impaired Skin Integrity  CLINICAL DOCUMENTATION CLARIFICATION FORM  Dear Doctor:  Clinical documentation (provided below) suggests Impaired Skin Integrity without suggestion of pressure.  For accurate ICD-10-CM code assignment to reflect severity of illness Cooperative/Alert/Awake no gross abnormalities